# Patient Record
Sex: MALE | Race: WHITE | Employment: OTHER | URBAN - METROPOLITAN AREA
[De-identification: names, ages, dates, MRNs, and addresses within clinical notes are randomized per-mention and may not be internally consistent; named-entity substitution may affect disease eponyms.]

---

## 2017-11-28 PROBLEM — N21.0 BLADDER CALCULI: Status: ACTIVE | Noted: 2017-11-28

## 2017-11-28 PROBLEM — E87.20 LACTIC ACIDOSIS: Status: ACTIVE | Noted: 2017-11-28

## 2017-11-28 PROBLEM — N12 PYELITIS: Status: ACTIVE | Noted: 2017-11-28

## 2017-11-28 PROBLEM — R82.81 PYURIA: Status: ACTIVE | Noted: 2017-11-28

## 2017-11-28 PROBLEM — I82.429 ILIAC VEIN THROMBOSIS (HCC): Status: ACTIVE | Noted: 2017-11-28

## 2017-11-28 PROBLEM — D72.829 LEUKOCYTOSIS: Status: ACTIVE | Noted: 2017-11-28

## 2017-11-28 PROBLEM — N28.89 URETERITIS: Status: ACTIVE | Noted: 2017-11-28

## 2017-11-28 PROBLEM — Z86.73 HISTORY OF STROKE: Status: ACTIVE | Noted: 2017-11-28

## 2018-03-29 ENCOUNTER — HOSPITAL ENCOUNTER (OUTPATIENT)
Dept: CT IMAGING | Age: 58
Discharge: HOME | End: 2018-03-29
Attending: UROLOGY | Admitting: UROLOGY

## 2018-03-29 DIAGNOSIS — N21.0 CALCULUS IN BLADDER: ICD-10-CM

## 2018-03-29 DIAGNOSIS — N20.0 KIDNEY STONE: ICD-10-CM

## 2018-04-10 PROBLEM — Z86.73 HISTORY OF STROKE: Status: RESOLVED | Noted: 2017-11-28 | Resolved: 2018-04-10

## 2018-04-10 PROBLEM — E87.20 LACTIC ACIDOSIS: Status: RESOLVED | Noted: 2017-11-28 | Resolved: 2018-04-10

## 2018-04-10 PROBLEM — D72.829 LEUCOCYTOSIS: Status: RESOLVED | Noted: 2017-11-28 | Resolved: 2018-04-10

## 2018-04-10 PROBLEM — R47.01 EXPRESSIVE APHASIA: Status: ACTIVE | Noted: 2018-03-18

## 2018-04-10 RX ORDER — TAMSULOSIN HYDROCHLORIDE 0.4 MG/1
0.4 CAPSULE ORAL
COMMUNITY
Start: 2018-03-20

## 2018-04-10 RX ORDER — LEVETIRACETAM 500 MG/1
500 TABLET ORAL
COMMUNITY
Start: 2018-03-20 | End: 2019-06-18 | Stop reason: ALTCHOICE

## 2018-04-11 ENCOUNTER — TELEPHONE (OUTPATIENT)
Dept: FAMILY MEDICINE CLINIC | Age: 58
End: 2018-04-11

## 2018-04-11 ENCOUNTER — OFFICE VISIT (OUTPATIENT)
Dept: FAMILY MEDICINE CLINIC | Age: 58
End: 2018-04-11

## 2018-04-11 VITALS
DIASTOLIC BLOOD PRESSURE: 79 MMHG | HEART RATE: 79 BPM | OXYGEN SATURATION: 99 % | RESPIRATION RATE: 16 BRPM | HEIGHT: 67 IN | SYSTOLIC BLOOD PRESSURE: 115 MMHG

## 2018-04-11 DIAGNOSIS — R47.01 EXPRESSIVE APHASIA: ICD-10-CM

## 2018-04-11 DIAGNOSIS — D68.9 COAGULOPATHY (HCC): ICD-10-CM

## 2018-04-11 DIAGNOSIS — I63.9 CEREBRAL INFARCTION, UNSPECIFIED MECHANISM (HCC): ICD-10-CM

## 2018-04-11 DIAGNOSIS — Z01.818 PREOP EXAMINATION: Primary | ICD-10-CM

## 2018-04-11 LAB
ALBUMIN SERPL-MCNC: 3.8 G/DL (ref 3.4–5)
ALP BLD-CCNC: 60 U/L (ref 40–129)
ALT SERPL-CCNC: 8 U/L (ref 10–40)
ANION GAP SERPL CALCULATED.3IONS-SCNC: 12 MMOL/L (ref 3–16)
APTT: 26.2 SEC (ref 24.1–34.9)
AST SERPL-CCNC: 10 U/L (ref 15–37)
BILIRUB SERPL-MCNC: 0.4 MG/DL (ref 0–1)
BILIRUBIN DIRECT: <0.2 MG/DL (ref 0–0.3)
BILIRUBIN, INDIRECT: ABNORMAL MG/DL (ref 0–1)
BUN BLDV-MCNC: 14 MG/DL (ref 7–20)
CALCIUM SERPL-MCNC: 9.1 MG/DL (ref 8.3–10.6)
CHLORIDE BLD-SCNC: 101 MMOL/L (ref 99–110)
CO2: 29 MMOL/L (ref 21–32)
CREAT SERPL-MCNC: 0.9 MG/DL (ref 0.9–1.3)
GFR AFRICAN AMERICAN: >60
GFR NON-AFRICAN AMERICAN: >60
GLUCOSE BLD-MCNC: 98 MG/DL (ref 70–99)
HCT VFR BLD CALC: 38.5 % (ref 40.5–52.5)
HEMOGLOBIN: 12.9 G/DL (ref 13.5–17.5)
INR BLD: 1.07 (ref 0.85–1.15)
MCH RBC QN AUTO: 28.1 PG (ref 26–34)
MCHC RBC AUTO-ENTMCNC: 33.5 G/DL (ref 31–36)
MCV RBC AUTO: 83.9 FL (ref 80–100)
PDW BLD-RTO: 14.8 % (ref 12.4–15.4)
PLATELET # BLD: 249 K/UL (ref 135–450)
PMV BLD AUTO: 7.8 FL (ref 5–10.5)
POTASSIUM SERPL-SCNC: 3.9 MMOL/L (ref 3.5–5.1)
PROTHROMBIN TIME: 12.1 SEC (ref 9.6–13)
RBC # BLD: 4.59 M/UL (ref 4.2–5.9)
SODIUM BLD-SCNC: 142 MMOL/L (ref 136–145)
TOTAL PROTEIN: 5.9 G/DL (ref 6.4–8.2)
WBC # BLD: 8.8 K/UL (ref 4–11)

## 2018-04-11 PROCEDURE — 93000 ELECTROCARDIOGRAM COMPLETE: CPT | Performed by: INTERNAL MEDICINE

## 2018-04-11 PROCEDURE — G8427 DOCREV CUR MEDS BY ELIG CLIN: HCPCS | Performed by: INTERNAL MEDICINE

## 2018-04-11 PROCEDURE — G8598 ASA/ANTIPLAT THER USED: HCPCS | Performed by: INTERNAL MEDICINE

## 2018-04-11 PROCEDURE — 3017F COLORECTAL CA SCREEN DOC REV: CPT | Performed by: INTERNAL MEDICINE

## 2018-04-11 PROCEDURE — G8420 CALC BMI NORM PARAMETERS: HCPCS | Performed by: INTERNAL MEDICINE

## 2018-04-11 PROCEDURE — 1036F TOBACCO NON-USER: CPT | Performed by: INTERNAL MEDICINE

## 2018-04-11 PROCEDURE — 99205 OFFICE O/P NEW HI 60 MIN: CPT | Performed by: INTERNAL MEDICINE

## 2018-04-11 ASSESSMENT — PATIENT HEALTH QUESTIONNAIRE - PHQ9
1. LITTLE INTEREST OR PLEASURE IN DOING THINGS: 0
SUM OF ALL RESPONSES TO PHQ QUESTIONS 1-9: 0
2. FEELING DOWN, DEPRESSED OR HOPELESS: 0
SUM OF ALL RESPONSES TO PHQ9 QUESTIONS 1 & 2: 0

## 2018-04-16 ENCOUNTER — TELEPHONE (OUTPATIENT)
Dept: FAMILY MEDICINE CLINIC | Age: 58
End: 2018-04-16

## 2018-04-16 DIAGNOSIS — R47.01 EXPRESSIVE APHASIA: Primary | ICD-10-CM

## 2018-04-20 ENCOUNTER — TELEPHONE (OUTPATIENT)
Dept: FAMILY MEDICINE CLINIC | Age: 58
End: 2018-04-20

## 2018-06-14 ENCOUNTER — TELEPHONE (OUTPATIENT)
Dept: FAMILY MEDICINE CLINIC | Age: 58
End: 2018-06-14

## 2018-06-28 ENCOUNTER — TELEPHONE (OUTPATIENT)
Dept: FAMILY MEDICINE CLINIC | Age: 58
End: 2018-06-28

## 2019-05-02 ENCOUNTER — TELEPHONE (OUTPATIENT)
Dept: FAMILY MEDICINE CLINIC | Age: 59
End: 2019-05-02

## 2019-05-02 NOTE — TELEPHONE ENCOUNTER
Please have him schedule an apt since not seen over a year. Referral can be made then and prostate check.  Not today

## 2019-05-06 PROBLEM — R82.81 PYURIA: Status: RESOLVED | Noted: 2017-11-28 | Resolved: 2019-05-06

## 2019-06-18 ENCOUNTER — APPOINTMENT (OUTPATIENT)
Dept: CT IMAGING | Age: 59
DRG: 300 | End: 2019-06-18
Payer: MEDICARE

## 2019-06-18 ENCOUNTER — HOSPITAL ENCOUNTER (INPATIENT)
Age: 59
LOS: 7 days | Discharge: NURSING, REHAB OR LONG-TERM CARE | DRG: 300 | End: 2019-06-25
Attending: EMERGENCY MEDICINE | Admitting: INTERNAL MEDICINE
Payer: MEDICARE

## 2019-06-18 DIAGNOSIS — T76.91XA SUSPECTED ELDER ABUSE, INITIAL ENCOUNTER: ICD-10-CM

## 2019-06-18 DIAGNOSIS — N39.0 URINARY TRACT INFECTION WITHOUT HEMATURIA, SITE UNSPECIFIED: ICD-10-CM

## 2019-06-18 DIAGNOSIS — R53.1 GENERAL WEAKNESS: Primary | ICD-10-CM

## 2019-06-18 DIAGNOSIS — I82.90 ACUTE DEEP VEIN THROMBOSIS (DVT) OF NON-EXTREMITY VEIN: ICD-10-CM

## 2019-06-18 PROBLEM — I82.890 DEEP VENOUS THROMBOSIS OF PELVIC VEIN: Status: ACTIVE | Noted: 2019-06-18

## 2019-06-18 LAB
A/G RATIO: 1.5 (ref 1.1–2.2)
ALBUMIN SERPL-MCNC: 4.4 G/DL (ref 3.4–5)
ALP BLD-CCNC: 91 U/L (ref 40–129)
ALT SERPL-CCNC: 8 U/L (ref 10–40)
ANION GAP SERPL CALCULATED.3IONS-SCNC: 16 MMOL/L (ref 3–16)
APTT: 27.4 SEC (ref 26–36)
AST SERPL-CCNC: 11 U/L (ref 15–37)
BACTERIA: ABNORMAL /HPF
BASOPHILS ABSOLUTE: 0.1 K/UL (ref 0–0.2)
BASOPHILS RELATIVE PERCENT: 0.3 %
BILIRUB SERPL-MCNC: 1.2 MG/DL (ref 0–1)
BILIRUBIN URINE: NEGATIVE
BLOOD, URINE: ABNORMAL
BUN BLDV-MCNC: 38 MG/DL (ref 7–20)
CALCIUM SERPL-MCNC: 9.5 MG/DL (ref 8.3–10.6)
CHLORIDE BLD-SCNC: 101 MMOL/L (ref 99–110)
CLARITY: ABNORMAL
CO2: 24 MMOL/L (ref 21–32)
COLOR: YELLOW
CREAT SERPL-MCNC: 1.5 MG/DL (ref 0.9–1.3)
EOSINOPHILS ABSOLUTE: 0 K/UL (ref 0–0.6)
EOSINOPHILS RELATIVE PERCENT: 0.2 %
GFR AFRICAN AMERICAN: 58
GFR NON-AFRICAN AMERICAN: 48
GLOBULIN: 2.9 G/DL
GLUCOSE BLD-MCNC: 136 MG/DL (ref 70–99)
GLUCOSE BLD-MCNC: 142 MG/DL (ref 70–99)
GLUCOSE URINE: NEGATIVE MG/DL
HCT VFR BLD CALC: 36.3 % (ref 40.5–52.5)
HEMOGLOBIN: 12 G/DL (ref 13.5–17.5)
INR BLD: 1.18 (ref 0.86–1.14)
KETONES, URINE: NEGATIVE MG/DL
LACTIC ACID, SEPSIS: 2.6 MMOL/L (ref 0.4–1.9)
LEUKOCYTE ESTERASE, URINE: ABNORMAL
LYMPHOCYTES ABSOLUTE: 1.5 K/UL (ref 1–5.1)
LYMPHOCYTES RELATIVE PERCENT: 8.6 %
MCH RBC QN AUTO: 28.9 PG (ref 26–34)
MCHC RBC AUTO-ENTMCNC: 32.9 G/DL (ref 31–36)
MCV RBC AUTO: 87.7 FL (ref 80–100)
MICROSCOPIC EXAMINATION: YES
MONOCYTES ABSOLUTE: 1.3 K/UL (ref 0–1.3)
MONOCYTES RELATIVE PERCENT: 7.1 %
NEUTROPHILS ABSOLUTE: 15 K/UL (ref 1.7–7.7)
NEUTROPHILS RELATIVE PERCENT: 83.8 %
NITRITE, URINE: NEGATIVE
PDW BLD-RTO: 13.9 % (ref 12.4–15.4)
PERFORMED ON: ABNORMAL
PH UA: 6.5 (ref 5–8)
PLATELET # BLD: 159 K/UL (ref 135–450)
PMV BLD AUTO: 7.8 FL (ref 5–10.5)
POTASSIUM REFLEX MAGNESIUM: 4.3 MMOL/L (ref 3.5–5.1)
PROTEIN UA: 100 MG/DL
PROTHROMBIN TIME: 13.5 SEC (ref 9.8–13)
RBC # BLD: 4.14 M/UL (ref 4.2–5.9)
RBC UA: ABNORMAL /HPF (ref 0–2)
SODIUM BLD-SCNC: 141 MMOL/L (ref 136–145)
SPECIFIC GRAVITY UA: 1.02 (ref 1–1.03)
TOTAL PROTEIN: 7.3 G/DL (ref 6.4–8.2)
URINE REFLEX TO CULTURE: YES
URINE TYPE: ABNORMAL
UROBILINOGEN, URINE: 0.2 E.U./DL
WBC # BLD: 17.9 K/UL (ref 4–11)
WBC UA: >100 /HPF (ref 0–5)

## 2019-06-18 PROCEDURE — 70450 CT HEAD/BRAIN W/O DYE: CPT

## 2019-06-18 PROCEDURE — 87186 SC STD MICRODIL/AGAR DIL: CPT

## 2019-06-18 PROCEDURE — 85610 PROTHROMBIN TIME: CPT

## 2019-06-18 PROCEDURE — 6370000000 HC RX 637 (ALT 250 FOR IP): Performed by: EMERGENCY MEDICINE

## 2019-06-18 PROCEDURE — 2060000000 HC ICU INTERMEDIATE R&B

## 2019-06-18 PROCEDURE — 1200000000 HC SEMI PRIVATE

## 2019-06-18 PROCEDURE — 2580000003 HC RX 258: Performed by: PHYSICIAN ASSISTANT

## 2019-06-18 PROCEDURE — 2580000003 HC RX 258: Performed by: EMERGENCY MEDICINE

## 2019-06-18 PROCEDURE — 96360 HYDRATION IV INFUSION INIT: CPT

## 2019-06-18 PROCEDURE — 99285 EMERGENCY DEPT VISIT HI MDM: CPT

## 2019-06-18 PROCEDURE — 80053 COMPREHEN METABOLIC PANEL: CPT

## 2019-06-18 PROCEDURE — 83605 ASSAY OF LACTIC ACID: CPT

## 2019-06-18 PROCEDURE — 87040 BLOOD CULTURE FOR BACTERIA: CPT

## 2019-06-18 PROCEDURE — 6360000002 HC RX W HCPCS: Performed by: EMERGENCY MEDICINE

## 2019-06-18 PROCEDURE — 85025 COMPLETE CBC W/AUTO DIFF WBC: CPT

## 2019-06-18 PROCEDURE — 85730 THROMBOPLASTIN TIME PARTIAL: CPT

## 2019-06-18 PROCEDURE — 87077 CULTURE AEROBIC IDENTIFY: CPT

## 2019-06-18 PROCEDURE — 6360000004 HC RX CONTRAST MEDICATION: Performed by: NURSE PRACTITIONER

## 2019-06-18 PROCEDURE — 96361 HYDRATE IV INFUSION ADD-ON: CPT

## 2019-06-18 PROCEDURE — 75635 CT ANGIO ABDOMINAL ARTERIES: CPT

## 2019-06-18 PROCEDURE — 71275 CT ANGIOGRAPHY CHEST: CPT

## 2019-06-18 PROCEDURE — 87801 DETECT AGNT MULT DNA AMPLI: CPT

## 2019-06-18 PROCEDURE — 87086 URINE CULTURE/COLONY COUNT: CPT

## 2019-06-18 PROCEDURE — 93005 ELECTROCARDIOGRAM TRACING: CPT | Performed by: PHYSICIAN ASSISTANT

## 2019-06-18 PROCEDURE — 81001 URINALYSIS AUTO W/SCOPE: CPT

## 2019-06-18 RX ORDER — HEPARIN SODIUM 1000 [USP'U]/ML
80 INJECTION, SOLUTION INTRAVENOUS; SUBCUTANEOUS ONCE
Status: DISCONTINUED | OUTPATIENT
Start: 2019-06-18 | End: 2019-06-18

## 2019-06-18 RX ORDER — HEPARIN SODIUM 1000 [USP'U]/ML
40 INJECTION, SOLUTION INTRAVENOUS; SUBCUTANEOUS PRN
Status: DISCONTINUED | OUTPATIENT
Start: 2019-06-19 | End: 2019-06-18

## 2019-06-18 RX ORDER — HEPARIN SODIUM 1000 [USP'U]/ML
80 INJECTION, SOLUTION INTRAVENOUS; SUBCUTANEOUS PRN
Status: DISCONTINUED | OUTPATIENT
Start: 2019-06-19 | End: 2019-06-18

## 2019-06-18 RX ORDER — 0.9 % SODIUM CHLORIDE 0.9 %
1000 INTRAVENOUS SOLUTION INTRAVENOUS ONCE
Status: DISCONTINUED | OUTPATIENT
Start: 2019-06-18 | End: 2019-06-18

## 2019-06-18 RX ORDER — 0.9 % SODIUM CHLORIDE 0.9 %
1000 INTRAVENOUS SOLUTION INTRAVENOUS ONCE
Status: COMPLETED | OUTPATIENT
Start: 2019-06-18 | End: 2019-06-18

## 2019-06-18 RX ORDER — FINASTERIDE 5 MG/1
5 TABLET, FILM COATED ORAL DAILY
COMMUNITY

## 2019-06-18 RX ORDER — 0.9 % SODIUM CHLORIDE 0.9 %
30 INTRAVENOUS SOLUTION INTRAVENOUS ONCE
Status: COMPLETED | OUTPATIENT
Start: 2019-06-18 | End: 2019-06-19

## 2019-06-18 RX ADMIN — APIXABAN 10 MG: 5 TABLET, FILM COATED ORAL at 23:59

## 2019-06-18 RX ADMIN — SODIUM CHLORIDE 1000 ML: 9 INJECTION, SOLUTION INTRAVENOUS at 23:59

## 2019-06-18 RX ADMIN — MEROPENEM 1 G: 1 INJECTION, POWDER, FOR SOLUTION INTRAVENOUS at 23:59

## 2019-06-18 RX ADMIN — IOPAMIDOL 85 ML: 755 INJECTION, SOLUTION INTRAVENOUS at 21:08

## 2019-06-18 RX ADMIN — SODIUM CHLORIDE 1000 ML: 9 INJECTION, SOLUTION INTRAVENOUS at 18:47
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
## 2019-06-18 NOTE — ED NOTES
Upon arrival pts bilateral feet are purple in color. Pt has had prior stroke MD called to bedside.       Rashid Martinez, RN  06/18/19 9792

## 2019-06-18 NOTE — PROGRESS NOTES
Attempted to straight  Cath patient pt was saturated with urine prior to cath.  Left straight cath in place fluids started family updated on POC

## 2019-06-18 NOTE — CONSULTS
Pharmacy to dose Heparin:  High dose Heparin GTT protocol. Baseline aPTT is 27.4 seconds. Give a 5,300 unit IV Bolus and begin the infusion at 11.8mL/hr. Recheck the aPTT 6 hours later.   Queenie Villarreal PharmD 6/18/2019 7:05 PM

## 2019-06-19 ENCOUNTER — APPOINTMENT (OUTPATIENT)
Dept: GENERAL RADIOLOGY | Age: 59
DRG: 300 | End: 2019-06-19
Payer: MEDICARE

## 2019-06-19 PROBLEM — N39.0 UTI (URINARY TRACT INFECTION): Status: ACTIVE | Noted: 2019-06-19

## 2019-06-19 LAB
ANION GAP SERPL CALCULATED.3IONS-SCNC: 9 MMOL/L (ref 3–16)
BASOPHILS ABSOLUTE: 0.1 K/UL (ref 0–0.2)
BASOPHILS RELATIVE PERCENT: 0.6 %
BUN BLDV-MCNC: 30 MG/DL (ref 7–20)
CALCIUM SERPL-MCNC: 8.9 MG/DL (ref 8.3–10.6)
CHLORIDE BLD-SCNC: 105 MMOL/L (ref 99–110)
CO2: 23 MMOL/L (ref 21–32)
CREAT SERPL-MCNC: 0.9 MG/DL (ref 0.9–1.3)
EKG ATRIAL RATE: 92 BPM
EKG DIAGNOSIS: NORMAL
EKG P AXIS: 57 DEGREES
EKG P-R INTERVAL: 130 MS
EKG Q-T INTERVAL: 340 MS
EKG QRS DURATION: 88 MS
EKG QTC CALCULATION (BAZETT): 420 MS
EKG R AXIS: 46 DEGREES
EKG T AXIS: 48 DEGREES
EKG VENTRICULAR RATE: 92 BPM
EOSINOPHILS ABSOLUTE: 0.2 K/UL (ref 0–0.6)
EOSINOPHILS RELATIVE PERCENT: 1.5 %
GFR AFRICAN AMERICAN: >60
GFR NON-AFRICAN AMERICAN: >60
GLUCOSE BLD-MCNC: 124 MG/DL (ref 70–99)
HCT VFR BLD CALC: 29.1 % (ref 40.5–52.5)
HEMOGLOBIN: 9.8 G/DL (ref 13.5–17.5)
LACTIC ACID, SEPSIS: 1.2 MMOL/L (ref 0.4–1.9)
LYMPHOCYTES ABSOLUTE: 2.2 K/UL (ref 1–5.1)
LYMPHOCYTES RELATIVE PERCENT: 20 %
MAGNESIUM: 2.2 MG/DL (ref 1.8–2.4)
MCH RBC QN AUTO: 29.1 PG (ref 26–34)
MCHC RBC AUTO-ENTMCNC: 33.6 G/DL (ref 31–36)
MCV RBC AUTO: 86.5 FL (ref 80–100)
MONOCYTES ABSOLUTE: 1.1 K/UL (ref 0–1.3)
MONOCYTES RELATIVE PERCENT: 9.8 %
NEUTROPHILS ABSOLUTE: 7.3 K/UL (ref 1.7–7.7)
NEUTROPHILS RELATIVE PERCENT: 68.1 %
PDW BLD-RTO: 13.6 % (ref 12.4–15.4)
PLATELET # BLD: 127 K/UL (ref 135–450)
PMV BLD AUTO: 7.7 FL (ref 5–10.5)
POTASSIUM REFLEX MAGNESIUM: 3.4 MMOL/L (ref 3.5–5.1)
RBC # BLD: 3.36 M/UL (ref 4.2–5.9)
SODIUM BLD-SCNC: 137 MMOL/L (ref 136–145)
WBC # BLD: 10.8 K/UL (ref 4–11)

## 2019-06-19 PROCEDURE — 99233 SBSQ HOSP IP/OBS HIGH 50: CPT | Performed by: INTERNAL MEDICINE

## 2019-06-19 PROCEDURE — 6360000002 HC RX W HCPCS: Performed by: INTERNAL MEDICINE

## 2019-06-19 PROCEDURE — 73562 X-RAY EXAM OF KNEE 3: CPT

## 2019-06-19 PROCEDURE — 6370000000 HC RX 637 (ALT 250 FOR IP): Performed by: INTERNAL MEDICINE

## 2019-06-19 PROCEDURE — 80048 BASIC METABOLIC PNL TOTAL CA: CPT

## 2019-06-19 PROCEDURE — 83735 ASSAY OF MAGNESIUM: CPT

## 2019-06-19 PROCEDURE — 36415 COLL VENOUS BLD VENIPUNCTURE: CPT

## 2019-06-19 PROCEDURE — 93010 ELECTROCARDIOGRAM REPORT: CPT | Performed by: INTERNAL MEDICINE

## 2019-06-19 PROCEDURE — 83605 ASSAY OF LACTIC ACID: CPT

## 2019-06-19 PROCEDURE — 2060000000 HC ICU INTERMEDIATE R&B

## 2019-06-19 PROCEDURE — 6370000000 HC RX 637 (ALT 250 FOR IP): Performed by: PHYSICIAN ASSISTANT

## 2019-06-19 PROCEDURE — 99223 1ST HOSP IP/OBS HIGH 75: CPT | Performed by: PSYCHIATRY & NEUROLOGY

## 2019-06-19 PROCEDURE — 2580000003 HC RX 258: Performed by: INTERNAL MEDICINE

## 2019-06-19 PROCEDURE — 6370000000 HC RX 637 (ALT 250 FOR IP): Performed by: EMERGENCY MEDICINE

## 2019-06-19 PROCEDURE — 85025 COMPLETE CBC W/AUTO DIFF WBC: CPT

## 2019-06-19 PROCEDURE — 1200000000 HC SEMI PRIVATE

## 2019-06-19 RX ORDER — ACETAMINOPHEN 325 MG/1
650 TABLET ORAL EVERY 4 HOURS PRN
Status: DISCONTINUED | OUTPATIENT
Start: 2019-06-19 | End: 2019-06-25 | Stop reason: HOSPADM

## 2019-06-19 RX ORDER — OXYCODONE HYDROCHLORIDE 5 MG/1
5 TABLET ORAL EVERY 6 HOURS PRN
Status: DISCONTINUED | OUTPATIENT
Start: 2019-06-19 | End: 2019-06-20

## 2019-06-19 RX ORDER — ONDANSETRON 2 MG/ML
4 INJECTION INTRAMUSCULAR; INTRAVENOUS EVERY 6 HOURS PRN
Status: DISCONTINUED | OUTPATIENT
Start: 2019-06-19 | End: 2019-06-25 | Stop reason: HOSPADM

## 2019-06-19 RX ORDER — SODIUM CHLORIDE 0.9 % (FLUSH) 0.9 %
10 SYRINGE (ML) INJECTION EVERY 12 HOURS SCHEDULED
Status: DISCONTINUED | OUTPATIENT
Start: 2019-06-19 | End: 2019-06-25 | Stop reason: HOSPADM

## 2019-06-19 RX ORDER — SODIUM CHLORIDE 0.9 % (FLUSH) 0.9 %
10 SYRINGE (ML) INJECTION PRN
Status: DISCONTINUED | OUTPATIENT
Start: 2019-06-19 | End: 2019-06-25 | Stop reason: HOSPADM

## 2019-06-19 RX ADMIN — MEROPENEM 1 G: 1 INJECTION, POWDER, FOR SOLUTION INTRAVENOUS at 12:00

## 2019-06-19 RX ADMIN — APIXABAN 10 MG: 5 TABLET, FILM COATED ORAL at 20:12

## 2019-06-19 RX ADMIN — Medication 10 ML: at 20:12

## 2019-06-19 RX ADMIN — OXYCODONE HYDROCHLORIDE 5 MG: 5 TABLET ORAL at 12:25

## 2019-06-19 RX ADMIN — APIXABAN 10 MG: 5 TABLET, FILM COATED ORAL at 09:16

## 2019-06-19 RX ADMIN — OXYCODONE HYDROCHLORIDE 5 MG: 5 TABLET ORAL at 18:29

## 2019-06-19 RX ADMIN — Medication 10 ML: at 09:17

## 2019-06-19 RX ADMIN — ACETAMINOPHEN 650 MG: 325 TABLET ORAL at 09:17

## 2019-06-19 RX ADMIN — MEROPENEM 1 G: 1 INJECTION, POWDER, FOR SOLUTION INTRAVENOUS at 22:47

## 2019-06-19 ASSESSMENT — PAIN DESCRIPTION - FREQUENCY: FREQUENCY: INTERMITTENT

## 2019-06-19 ASSESSMENT — PAIN SCALES - WONG BAKER
WONGBAKER_NUMERICALRESPONSE: 4
WONGBAKER_NUMERICALRESPONSE: 0

## 2019-06-19 ASSESSMENT — PAIN SCALES - GENERAL
PAINLEVEL_OUTOF10: 2
PAINLEVEL_OUTOF10: 2
PAINLEVEL_OUTOF10: 4
PAINLEVEL_OUTOF10: 4
PAINLEVEL_OUTOF10: 0
PAINLEVEL_OUTOF10: 5
PAINLEVEL_OUTOF10: 0
PAINLEVEL_OUTOF10: 1

## 2019-06-19 ASSESSMENT — PAIN DESCRIPTION - ORIENTATION
ORIENTATION: RIGHT;LEFT
ORIENTATION: LEFT

## 2019-06-19 ASSESSMENT — PAIN DESCRIPTION - PAIN TYPE
TYPE: ACUTE PAIN
TYPE: ACUTE PAIN

## 2019-06-19 ASSESSMENT — PAIN DESCRIPTION - LOCATION
LOCATION: KNEE
LOCATION: LEG

## 2019-06-19 ASSESSMENT — PAIN DESCRIPTION - PROGRESSION: CLINICAL_PROGRESSION: GRADUALLY WORSENING

## 2019-06-19 ASSESSMENT — PAIN DESCRIPTION - DESCRIPTORS: DESCRIPTORS: ACHING

## 2019-06-19 ASSESSMENT — PAIN DESCRIPTION - ONSET
ONSET: GRADUAL
ONSET: GRADUAL
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
## 2019-06-19 NOTE — PLAN OF CARE
Problem: Infection:  Goal: Will remain free from infection  Description  Will remain free from infection  Outcome: Ongoing     Problem: Safety:  Goal: Free from accidental physical injury  Description  Free from accidental physical injury  Outcome: Ongoing  Goal: Free from intentional harm  Description  Free from intentional harm  Outcome: Ongoing     Problem: Daily Care:  Goal: Daily care needs are met  Description  Daily care needs are met  Outcome: Ongoing     Problem: Pain:  Goal: Patient's pain/discomfort is manageable  Description  Patient's pain/discomfort is manageable  Outcome: Ongoing     Problem: Skin Integrity:  Goal: Skin integrity will stabilize  Description  Skin integrity will stabilize  Outcome: Ongoing     Problem: Falls - Risk of:  Goal: Will remain free from falls  Description  Will remain free from falls  Outcome: Ongoing  Goal: Absence of physical injury  Description  Absence of physical injury  Outcome: Ongoing
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
## 2019-06-19 NOTE — PROGRESS NOTES
Occupational Therapy/Physical Therapy  Orders received, chart reviewed. RN requests holding therapy this am as patient has a lot going on right now. May be appropriate in pm.  Will reattempt. Gloria Guzman OTR/MECCA [address], PT, DPT #[id number]    Spoke with  who recommends holding evaluation until 6/20/19.   Gloria Guzman OTR/L [address], PT, DPT #[id number]

## 2019-06-19 NOTE — PROGRESS NOTES
Pt sitting in bed. Asked pt if he is in pain and points to his left knee. Pt can have pain medicine in one hour and let him know that. Call light within reach. Tele-sitter in room.

## 2019-06-19 NOTE — ED NOTES
Per MD Agustina Valerio, heparin gtt to be started d/t results of Ct scan of chest.      Rosalee Kaiser RN  06/18/19 5559
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
## 2019-06-19 NOTE — PROGRESS NOTES
Pt son showed up at the  of hospital.  States he needs to see the pt advocate to help him since his power of  paper work has run out. I checked with registration and the advocate is gone for the day. I asked him to come back tomorrow to see the advocate. As pt son walked to entrance he said \" you all are playing my dad who has aphasia but you state he does not want to see me\"  Son asked to leave at this time.  Nilam Gould Rn
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
## 2019-06-19 NOTE — PROGRESS NOTES
Called the [facility] Nurse per order and facility procedures. Seamus Wynne called back to get patient info.   Jessica Lemus RN

## 2019-06-19 NOTE — PROGRESS NOTES
Pt sitting in bed and just finished 100% of lunch. Asked pt if his pain is better and he nodded yes. Radiology in doing x ray now. Call light within reach. Tele-sitter on.
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
## 2019-06-19 NOTE — PROGRESS NOTES
Pt oriented to room and call light. Demonstrates call light use. Pt unable to express needs or wants. Trying to tell me something about his legs. Coworkers brought to bedside for help and only gathered him telling us about his IVC filter and blood clot. He does say \"yes\" when ask if he is having some pain BLE then also grabs penis. Attempt to answer admission questions with patient unsuccessful. He is becoming more agitated trying to communicate and unable to answer yes or no questions well at this time. Water provided. Urinal offered. Pt denies needs at this time. Calm quiet environment provided.
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
## 2019-06-20 LAB
ANION GAP SERPL CALCULATED.3IONS-SCNC: 10 MMOL/L (ref 3–16)
BASOPHILS ABSOLUTE: 0.1 K/UL (ref 0–0.2)
BASOPHILS RELATIVE PERCENT: 0.6 %
BUN BLDV-MCNC: 18 MG/DL (ref 7–20)
CALCIUM SERPL-MCNC: 9.1 MG/DL (ref 8.3–10.6)
CHLORIDE BLD-SCNC: 102 MMOL/L (ref 99–110)
CO2: 25 MMOL/L (ref 21–32)
CREAT SERPL-MCNC: 0.7 MG/DL (ref 0.9–1.3)
EOSINOPHILS ABSOLUTE: 0.3 K/UL (ref 0–0.6)
EOSINOPHILS RELATIVE PERCENT: 2.9 %
GFR AFRICAN AMERICAN: >60
GFR NON-AFRICAN AMERICAN: >60
GLUCOSE BLD-MCNC: 120 MG/DL (ref 70–99)
HCT VFR BLD CALC: 31.6 % (ref 40.5–52.5)
HEMOGLOBIN: 10.3 G/DL (ref 13.5–17.5)
LYMPHOCYTES ABSOLUTE: 1.8 K/UL (ref 1–5.1)
LYMPHOCYTES RELATIVE PERCENT: 15.4 %
MCH RBC QN AUTO: 28.4 PG (ref 26–34)
MCHC RBC AUTO-ENTMCNC: 32.5 G/DL (ref 31–36)
MCV RBC AUTO: 87.5 FL (ref 80–100)
MONOCYTES ABSOLUTE: 1.2 K/UL (ref 0–1.3)
MONOCYTES RELATIVE PERCENT: 9.7 %
NEUTROPHILS ABSOLUTE: 8.6 K/UL (ref 1.7–7.7)
NEUTROPHILS RELATIVE PERCENT: 71.4 %
PDW BLD-RTO: 14 % (ref 12.4–15.4)
PLATELET # BLD: 153 K/UL (ref 135–450)
PMV BLD AUTO: 7.6 FL (ref 5–10.5)
POTASSIUM SERPL-SCNC: 4 MMOL/L (ref 3.5–5.1)
RBC # BLD: 3.61 M/UL (ref 4.2–5.9)
REPORT: NORMAL
SODIUM BLD-SCNC: 137 MMOL/L (ref 136–145)
WBC # BLD: 12 K/UL (ref 4–11)

## 2019-06-20 PROCEDURE — 1200000000 HC SEMI PRIVATE

## 2019-06-20 PROCEDURE — 80048 BASIC METABOLIC PNL TOTAL CA: CPT

## 2019-06-20 PROCEDURE — 97162 PT EVAL MOD COMPLEX 30 MIN: CPT

## 2019-06-20 PROCEDURE — 2580000003 HC RX 258: Performed by: PHYSICIAN ASSISTANT

## 2019-06-20 PROCEDURE — 6360000002 HC RX W HCPCS: Performed by: PHYSICIAN ASSISTANT

## 2019-06-20 PROCEDURE — 36415 COLL VENOUS BLD VENIPUNCTURE: CPT

## 2019-06-20 PROCEDURE — 94761 N-INVAS EAR/PLS OXIMETRY MLT: CPT

## 2019-06-20 PROCEDURE — 97530 THERAPEUTIC ACTIVITIES: CPT

## 2019-06-20 PROCEDURE — 6360000002 HC RX W HCPCS: Performed by: INTERNAL MEDICINE

## 2019-06-20 PROCEDURE — 97166 OT EVAL MOD COMPLEX 45 MIN: CPT

## 2019-06-20 PROCEDURE — 6370000000 HC RX 637 (ALT 250 FOR IP): Performed by: EMERGENCY MEDICINE

## 2019-06-20 PROCEDURE — 2060000000 HC ICU INTERMEDIATE R&B

## 2019-06-20 PROCEDURE — 99233 SBSQ HOSP IP/OBS HIGH 50: CPT | Performed by: INTERNAL MEDICINE

## 2019-06-20 PROCEDURE — 6370000000 HC RX 637 (ALT 250 FOR IP): Performed by: PHYSICIAN ASSISTANT

## 2019-06-20 PROCEDURE — 85025 COMPLETE CBC W/AUTO DIFF WBC: CPT

## 2019-06-20 PROCEDURE — 2580000003 HC RX 258: Performed by: INTERNAL MEDICINE

## 2019-06-20 RX ORDER — OXYCODONE HYDROCHLORIDE 5 MG/1
5 TABLET ORAL EVERY 4 HOURS PRN
Status: DISCONTINUED | OUTPATIENT
Start: 2019-06-20 | End: 2019-06-25 | Stop reason: HOSPADM

## 2019-06-20 RX ADMIN — OXYCODONE HYDROCHLORIDE 5 MG: 5 TABLET ORAL at 21:36

## 2019-06-20 RX ADMIN — OXYCODONE HYDROCHLORIDE 5 MG: 5 TABLET ORAL at 14:33

## 2019-06-20 RX ADMIN — OXYCODONE HYDROCHLORIDE 5 MG: 5 TABLET ORAL at 10:17

## 2019-06-20 RX ADMIN — OXYCODONE HYDROCHLORIDE 5 MG: 5 TABLET ORAL at 06:04

## 2019-06-20 RX ADMIN — Medication 10 ML: at 21:36

## 2019-06-20 RX ADMIN — Medication 10 ML: at 09:15

## 2019-06-20 RX ADMIN — APIXABAN 10 MG: 5 TABLET, FILM COATED ORAL at 09:15

## 2019-06-20 RX ADMIN — APIXABAN 10 MG: 5 TABLET, FILM COATED ORAL at 21:36

## 2019-06-20 RX ADMIN — VANCOMYCIN HYDROCHLORIDE 1000 MG: 1 INJECTION, POWDER, LYOPHILIZED, FOR SOLUTION INTRAVENOUS at 13:38

## 2019-06-20 RX ADMIN — MEROPENEM 1 G: 1 INJECTION, POWDER, FOR SOLUTION INTRAVENOUS at 11:07

## 2019-06-20 ASSESSMENT — PAIN DESCRIPTION - LOCATION
LOCATION: KNEE

## 2019-06-20 ASSESSMENT — PAIN SCALES - GENERAL
PAINLEVEL_OUTOF10: 5
PAINLEVEL_OUTOF10: 0
PAINLEVEL_OUTOF10: 4
PAINLEVEL_OUTOF10: 0
PAINLEVEL_OUTOF10: 7
PAINLEVEL_OUTOF10: 0
PAINLEVEL_OUTOF10: 2

## 2019-06-20 ASSESSMENT — PAIN SCALES - WONG BAKER
WONGBAKER_NUMERICALRESPONSE: 6
WONGBAKER_NUMERICALRESPONSE: 4
WONGBAKER_NUMERICALRESPONSE: 2
WONGBAKER_NUMERICALRESPONSE: 4

## 2019-06-20 ASSESSMENT — PAIN DESCRIPTION - PROGRESSION
CLINICAL_PROGRESSION: GRADUALLY WORSENING
CLINICAL_PROGRESSION: GRADUALLY IMPROVING

## 2019-06-20 ASSESSMENT — PAIN DESCRIPTION - ORIENTATION
ORIENTATION: LEFT
ORIENTATION: LEFT

## 2019-06-20 ASSESSMENT — PAIN DESCRIPTION - PAIN TYPE
TYPE: ACUTE PAIN

## 2019-06-20 ASSESSMENT — PAIN DESCRIPTION - ONSET: ONSET: ON-GOING

## 2019-06-20 ASSESSMENT — PAIN DESCRIPTION - FREQUENCY: FREQUENCY: CONTINUOUS

## 2019-06-20 NOTE — PROGRESS NOTES
Pt pointing to knee when PCA in room. Asked if pt was hurting and nodded head yes. PRN pain pill given to pt, rated pain at 4-5 using faces scale.

## 2019-06-20 NOTE — PLAN OF CARE
Problem: Infection:  Goal: Will remain free from infection  Description  Will remain free from infection  Outcome: Ongoing     Problem: Safety:  Goal: Free from accidental physical injury  Description  Free from accidental physical injury  Outcome: Ongoing  Goal: Free from intentional harm  Description  Free from intentional harm  Outcome: Ongoing     Problem: Daily Care:  Goal: Daily care needs are met  Description  Daily care needs are met  Outcome: Ongoing     Problem: Pain:  Goal: Patient's pain/discomfort is manageable  Description  Patient's pain/discomfort is manageable  Outcome: Ongoing  Goal: Pain level will decrease  Description  Pain level will decrease  Outcome: Ongoing  Goal: Control of chronic pain  Description  Control of chronic pain  Outcome: Ongoing     Problem: Falls - Risk of:  Goal: Will remain free from falls  Description  Will remain free from falls  Outcome: Ongoing  Goal: Absence of physical injury  Description  Absence of physical injury  Outcome: Ongoing

## 2019-06-20 NOTE — PROGRESS NOTES
IV Merrem  hooked to pts IV  and pt started nodding head no, and pinching line pointing to IV, educated pt what med was being administered for. Pt still nodding head no. Made clinical Rusty Joy RN  aware. Rusty Joy RN  went to bedside with writer and educated pt on IV Merrem, pt becomes anxious and conts to point to IV Merrem and nods head no. Will cont to monitor and attempt med administration later.

## 2019-06-20 NOTE — PROGRESS NOTES
Attempted to administer IV Merrem pt still declines conts to nodding head no and pointing at IV. Perfect served Dr Sergio Navarro  to make aware, pt  Here for  UTI declines IV Merrem. Will cont to monitor.

## 2019-06-20 NOTE — CARE COORDINATION
INTERDISCIPLINARY PLAN OF CARE CONFERENCE    Date/Time: 6/20/2019 2:06 PM  Completed by: Cass Paulson, Case Management      Patient Name:  Chapin Cruz  YOB: 1960  Admitting Diagnosis: Deep venous thrombosis of pelvic vein [I82.90]     Admit Date/Time:  6/18/2019  6:13 PM    Chart reviewed. Interdisciplinary team met to discuss patient progress and discharge plans. Disciplines included Case Management, Nursing, and Dietitian. Current Status:ongoing    PT/OT recommendation:SNF    Anticipated Discharge Date: tbd  Expected D/C Disposition:  Home vs SNF  Confirmed plan with patient and/or family No  Discharge Plan Comments: Reviewed chart. Role of discharge planner explained and patient verbalized understanding by shaking his head yes. Pt wrote CM's name (Margot) on paper, as he was aware of CM. CM explained that  PT/OT needed to work with him today. CM asked if he planned to go home. Pt pointed to son's \"Dante\" name on paper and shook his head \"no\" quickly. CM explained that CM understood that pt did not want to go back home with Felipe Lan. Pt stated \"shew\". CM asked if this was correct. Pt shook his head \"yes\". CM explained that PT/OT was going to work with pt. Pt did not want CM to leave the room while PT./TO was there. CM explained that the plan if to have pt go to a Nursing facility for rehab. CM gave pt a list and showed pt [facility]--pt shook is head \"no. CM pointed and stated [facility] and pt shook his no. CM stated and pointed to [facility] and pt stated yes. Have not placed a referral to [facility] as of yet. Will wait, as pt tested positive for Bacteremia, as of now. Dr. Haydee Webb is calling pt's son, Felipe Lan. Dr. Gerda Martinez is calling pt's PCP Dr. Burgess Fitzgerald ([phone]) to discuss where pt was prior to admission. Per Dr. Gerda Martinez, would like a Speech eval to assess consistent way for pt to communicate due to expressive aphasia. Looking for a reliable way to communicate.  Order
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
## 2019-06-20 NOTE — PROGRESS NOTES
Pt sitting in bed awake. Educated pt on new iv vanc for infection in blood. Pt did not want to have medication hung. Educated the pt on the consequences if he does not get the antibiotic. After much education, pt agreed to receive medication.

## 2019-06-20 NOTE — PROGRESS NOTES
Pt sitting in bed this morning. Asked pt if he would like visitors or his son to visit today and nodded his head no. Asked him if he was in pain and nodded head no. Pt took meds this morning. Bruising around pt's eyes, knees, ankles/feet, and around penis. Call light within reach. Alarm in place.
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
## 2019-06-20 NOTE — PROGRESS NOTES
Pharmacy note:  Vancomycin Day #  1  Patient is a 60 yo male with MRSA bacteremia. Pharmacy was consulted to dose Vancomycin    WBC           BUN/SCr            Calc. CrCl                Ht.             Wt.   12                18/0.7               106 ml/min               5'7\"            65.8 kg    Based on patient's age, weight, and renal function will start Vancomycin 1000 mg IVPB q12h and check trough at steady state. Will adjust as necessary. Chriss Dhaliwal Pharm. D. 6/20/2019 2:14 PM

## 2019-06-21 LAB
ANION GAP SERPL CALCULATED.3IONS-SCNC: 8 MMOL/L (ref 3–16)
BASOPHILS ABSOLUTE: 0.1 K/UL (ref 0–0.2)
BASOPHILS RELATIVE PERCENT: 0.8 %
BUN BLDV-MCNC: 13 MG/DL (ref 7–20)
CALCIUM SERPL-MCNC: 8.5 MG/DL (ref 8.3–10.6)
CHLORIDE BLD-SCNC: 99 MMOL/L (ref 99–110)
CO2: 26 MMOL/L (ref 21–32)
CREAT SERPL-MCNC: 0.8 MG/DL (ref 0.9–1.3)
EOSINOPHILS ABSOLUTE: 0.5 K/UL (ref 0–0.6)
EOSINOPHILS RELATIVE PERCENT: 4.2 %
GFR AFRICAN AMERICAN: >60
GFR NON-AFRICAN AMERICAN: >60
GLUCOSE BLD-MCNC: 100 MG/DL (ref 70–99)
HCT VFR BLD CALC: 30.7 % (ref 40.5–52.5)
HEMOGLOBIN: 10.3 G/DL (ref 13.5–17.5)
LV EF: 53 %
LVEF MODALITY: NORMAL
LYMPHOCYTES ABSOLUTE: 2.7 K/UL (ref 1–5.1)
LYMPHOCYTES RELATIVE PERCENT: 23.3 %
MCH RBC QN AUTO: 29.3 PG (ref 26–34)
MCHC RBC AUTO-ENTMCNC: 33.7 G/DL (ref 31–36)
MCV RBC AUTO: 87.1 FL (ref 80–100)
MONOCYTES ABSOLUTE: 1.1 K/UL (ref 0–1.3)
MONOCYTES RELATIVE PERCENT: 9.5 %
NEUTROPHILS ABSOLUTE: 7.1 K/UL (ref 1.7–7.7)
NEUTROPHILS RELATIVE PERCENT: 62.2 %
ORGANISM: ABNORMAL
ORGANISM: ABNORMAL
PDW BLD-RTO: 13.4 % (ref 12.4–15.4)
PLATELET # BLD: 179 K/UL (ref 135–450)
PMV BLD AUTO: 7.7 FL (ref 5–10.5)
POTASSIUM REFLEX MAGNESIUM: 3.8 MMOL/L (ref 3.5–5.1)
RBC # BLD: 3.52 M/UL (ref 4.2–5.9)
SODIUM BLD-SCNC: 133 MMOL/L (ref 136–145)
URINE CULTURE, ROUTINE: ABNORMAL
WBC # BLD: 11.5 K/UL (ref 4–11)

## 2019-06-21 PROCEDURE — 2580000003 HC RX 258: Performed by: PHYSICIAN ASSISTANT

## 2019-06-21 PROCEDURE — 2580000003 HC RX 258: Performed by: INTERNAL MEDICINE

## 2019-06-21 PROCEDURE — 2060000000 HC ICU INTERMEDIATE R&B

## 2019-06-21 PROCEDURE — 85025 COMPLETE CBC W/AUTO DIFF WBC: CPT

## 2019-06-21 PROCEDURE — 1200000000 HC SEMI PRIVATE

## 2019-06-21 PROCEDURE — 92523 SPEECH SOUND LANG COMPREHEN: CPT

## 2019-06-21 PROCEDURE — 6370000000 HC RX 637 (ALT 250 FOR IP): Performed by: PHYSICIAN ASSISTANT

## 2019-06-21 PROCEDURE — 36415 COLL VENOUS BLD VENIPUNCTURE: CPT

## 2019-06-21 PROCEDURE — 6360000002 HC RX W HCPCS: Performed by: PHYSICIAN ASSISTANT

## 2019-06-21 PROCEDURE — 94761 N-INVAS EAR/PLS OXIMETRY MLT: CPT

## 2019-06-21 PROCEDURE — 6370000000 HC RX 637 (ALT 250 FOR IP): Performed by: INTERNAL MEDICINE

## 2019-06-21 PROCEDURE — 6370000000 HC RX 637 (ALT 250 FOR IP): Performed by: EMERGENCY MEDICINE

## 2019-06-21 PROCEDURE — 80048 BASIC METABOLIC PNL TOTAL CA: CPT

## 2019-06-21 PROCEDURE — 99232 SBSQ HOSP IP/OBS MODERATE 35: CPT | Performed by: INTERNAL MEDICINE

## 2019-06-21 PROCEDURE — 93306 TTE W/DOPPLER COMPLETE: CPT

## 2019-06-21 RX ORDER — NITROFURANTOIN 25; 75 MG/1; MG/1
100 CAPSULE ORAL EVERY 6 HOURS SCHEDULED
Status: DISCONTINUED | OUTPATIENT
Start: 2019-06-21 | End: 2019-06-25 | Stop reason: HOSPADM

## 2019-06-21 RX ADMIN — OXYCODONE HYDROCHLORIDE 5 MG: 5 TABLET ORAL at 01:42

## 2019-06-21 RX ADMIN — NITROFURANTOIN (MONOHYDRATE/MACROCRYSTALS) 100 MG: 75; 25 CAPSULE ORAL at 14:19

## 2019-06-21 RX ADMIN — OXYCODONE HYDROCHLORIDE 5 MG: 5 TABLET ORAL at 23:05

## 2019-06-21 RX ADMIN — VANCOMYCIN HYDROCHLORIDE 1000 MG: 1 INJECTION, POWDER, LYOPHILIZED, FOR SOLUTION INTRAVENOUS at 00:20

## 2019-06-21 RX ADMIN — APIXABAN 10 MG: 5 TABLET, FILM COATED ORAL at 09:30

## 2019-06-21 RX ADMIN — OXYCODONE HYDROCHLORIDE 5 MG: 5 TABLET ORAL at 06:15

## 2019-06-21 RX ADMIN — Medication 10 ML: at 22:16

## 2019-06-21 RX ADMIN — OXYCODONE HYDROCHLORIDE 5 MG: 5 TABLET ORAL at 14:19

## 2019-06-21 RX ADMIN — APIXABAN 10 MG: 5 TABLET, FILM COATED ORAL at 22:15

## 2019-06-21 RX ADMIN — Medication 10 ML: at 09:31

## 2019-06-21 ASSESSMENT — PAIN SCALES - GENERAL
PAINLEVEL_OUTOF10: 5
PAINLEVEL_OUTOF10: 7
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
## 2019-06-21 NOTE — PROGRESS NOTES
Shift assessment complete. See flow sheet. Due medications administered per MD orders. See MAR. Vital signs stable. Patient is alert and nonverbal. Pt denies any present needs/concerns. Call light explained and in reach. Will continue to monitor.

## 2019-06-21 NOTE — PROGRESS NOTES
Patient took scheduled medication and prn pain medication at this time. Patient adamit about keeping wrappers to medications at this time. Patient took each pill whole with water.  Sarah Neal

## 2019-06-21 NOTE — PROGRESS NOTES
Patient alert in bed, patient agreeable to morning assessment after attempting x 2. Patient took pills whole one at at time, with water patient tolerated well. Patient's RUE is flaccid, foot drop noted on RLE. Patient unable to verbalize needs but consistently rubbing upper thighs, time of next pain medication reviewed with patient. Blood drainage noted around IV site, blood return noted when flushed. AVS in place for patient safety.  Sarah Neal
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
## 2019-06-21 NOTE — PROGRESS NOTES
Patient awoken from sleep easily. Patient shown scheduled PO ABT capsule at this time, patient shook head no in regard to taking medication. This nurse worked to educate patient on the importance of taking ABT to treat UTI, patient continued to shake head and refuse medication. Will attempt again.   Sarah Neal

## 2019-06-21 NOTE — PROGRESS NOTES
Speech Language Pathology  Evaluation Attempt Note    Attempted x2 to see pt for speech/swallowing evaluation. Reviewed chart, spoke with RN prior to attempts. During both attempts, pt shook head 'no' when asked to participate. Will re-attempt later as able. Of note, in September 2017 during hospitalization at [facility] following CVA, speech therapy note indicated pt with global aphasia and yes/no accuracy of 25% at that time; as it has been ~2 years since stroke, may expect some improvement in current yes/no accuracy. Also, per results of MBSS completed during [facility] stay in 2017, pt tolerated regular/thin liquids, but silently aspirated on thin liquid with straws.     Rut Hudson M.A., [address]  Speech-Language Pathologist
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
## 2019-06-22 LAB
BLOOD CULTURE, ROUTINE: ABNORMAL
ORGANISM: ABNORMAL
ORGANISM: ABNORMAL

## 2019-06-22 PROCEDURE — 99232 SBSQ HOSP IP/OBS MODERATE 35: CPT | Performed by: INTERNAL MEDICINE

## 2019-06-22 PROCEDURE — 1200000000 HC SEMI PRIVATE

## 2019-06-22 PROCEDURE — 6370000000 HC RX 637 (ALT 250 FOR IP): Performed by: PHYSICIAN ASSISTANT

## 2019-06-22 PROCEDURE — 2580000003 HC RX 258: Performed by: INTERNAL MEDICINE

## 2019-06-22 PROCEDURE — 6370000000 HC RX 637 (ALT 250 FOR IP): Performed by: EMERGENCY MEDICINE

## 2019-06-22 PROCEDURE — 94761 N-INVAS EAR/PLS OXIMETRY MLT: CPT

## 2019-06-22 PROCEDURE — 92507 TX SP LANG VOICE COMM INDIV: CPT

## 2019-06-22 PROCEDURE — 6370000000 HC RX 637 (ALT 250 FOR IP): Performed by: INTERNAL MEDICINE

## 2019-06-22 RX ADMIN — APIXABAN 10 MG: 5 TABLET, FILM COATED ORAL at 09:05

## 2019-06-22 RX ADMIN — OXYCODONE HYDROCHLORIDE 5 MG: 5 TABLET ORAL at 11:58

## 2019-06-22 RX ADMIN — APIXABAN 10 MG: 5 TABLET, FILM COATED ORAL at 22:14

## 2019-06-22 RX ADMIN — Medication 10 ML: at 09:16

## 2019-06-22 RX ADMIN — Medication 10 ML: at 22:14

## 2019-06-22 RX ADMIN — OXYCODONE HYDROCHLORIDE 5 MG: 5 TABLET ORAL at 18:40

## 2019-06-22 ASSESSMENT — PAIN SCALES - GENERAL
PAINLEVEL_OUTOF10: 0
PAINLEVEL_OUTOF10: 5
PAINLEVEL_OUTOF10: 4

## 2019-06-22 NOTE — PLAN OF CARE
Problem: Infection:  Goal: Will remain free from infection  Description  Will remain free from infection  Outcome: Ongoing     Problem: Safety:  Goal: Free from accidental physical injury  Description  Free from accidental physical injury  Outcome: Ongoing  Goal: Free from intentional harm  Description  Free from intentional harm  Outcome: Ongoing     Problem: Daily Care:  Goal: Daily care needs are met  Description  Daily care needs are met  Outcome: Ongoing     Problem: Pain:  Goal: Patient's pain/discomfort is manageable  Description  Patient's pain/discomfort is manageable  Outcome: Ongoing  Goal: Pain level will decrease  Description  Pain level will decrease  Outcome: Ongoing  Goal: Control of acute pain  Description  Control of acute pain  Outcome: Ongoing     Problem: Skin Integrity:  Goal: Skin integrity will stabilize  Description  Skin integrity will stabilize  Outcome: Ongoing     Problem: Discharge Planning:  Goal: Patients continuum of care needs are met  Description  Patients continuum of care needs are met  Outcome: Ongoing     Problem: Falls - Risk of:  Goal: Will remain free from falls  Description  Will remain free from falls  Outcome: Ongoing  Goal: Absence of physical injury  Description  Absence of physical injury  Outcome: Ongoing     Problem: Urinary Elimination:  Goal: Signs and symptoms of infection will decrease  Description  Signs and symptoms of infection will decrease  Outcome: Ongoing     Problem: Risk for Impaired Skin Integrity  Goal: Tissue integrity - skin and mucous membranes  Description  Structural intactness and normal physiological function of skin and  mucous membranes.   Outcome: Ongoing     Problem: Discharge Planning:  Goal: Participates in care planning  Description  Participates in care planning  Outcome: Ongoing     Problem: Injury - Risk of, Physical Injury:  Goal: Will remain free from falls  Description  Will remain free from falls  Outcome: Ongoing  Goal: Absence
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
## 2019-06-22 NOTE — PROGRESS NOTES
Awake; denies needs/concerns. Requested door to be closed. telesitter in use.   Will continue to monitor

## 2019-06-22 NOTE — PROGRESS NOTES
Occupational/Physical Therapy Attempt    Unit: [address] [place]   Date:  6/22/2019  Patient Name:    Checo King  Admitting diagnosis:  Deep venous thrombosis of pelvic vein [I82.90]  Admit Date:  6/18/2019    Attempt @ 1225: Decline. Pt waving hand at therapist for them to leave the room. Pt continues to perserverate on the following items: 1. Holding eliquis packaging, pointing to pill drawing on paper, then pointing to drawing of stars 2. Pointing to L knee. Pt looking at Nikolai Quiroz and showing her the medication. Will re-attempt as time allows and pt is agreeable/appropriate. Thank you.      Mariya Almeida, MOT, OTR/L   [id number]  Magen Richards, PT #[id number]     No Charge

## 2019-06-22 NOTE — PROGRESS NOTES
[facility]

## 2019-06-22 NOTE — PROGRESS NOTES
Bedside report and care transferred to [facility]. AT [facility]. Pt denies further needs.

## 2019-06-22 NOTE — PROGRESS NOTES
and waving SLP away. Treatment discontinued at this time. See above goals for current level of function. Recommend continue multi-modal communication methods to ensure patient's wants and needs are met. ST will continue to follow. Charges/Time  Time in: 911  Time out: 927 (16 minutes)   Charges: Speech/language treatment    Patient was left semi-reclined in bed, with call light in reach, all need met. Safety handoff completed with Rn/Fern, who verbalized understanding    Plan:  Continued daily Speech/Language treatment with goals per ST plan of care. If patient discharges prior to next ST treatment session, this note will serve as the discharge.  Speech therapy is recommended at discharge    [address] [facility]  Speech Language Pathologist

## 2019-06-23 PROCEDURE — 6370000000 HC RX 637 (ALT 250 FOR IP): Performed by: EMERGENCY MEDICINE

## 2019-06-23 PROCEDURE — 1200000000 HC SEMI PRIVATE

## 2019-06-23 PROCEDURE — 2580000003 HC RX 258: Performed by: INTERNAL MEDICINE

## 2019-06-23 PROCEDURE — 6370000000 HC RX 637 (ALT 250 FOR IP): Performed by: PHYSICIAN ASSISTANT

## 2019-06-23 PROCEDURE — 94761 N-INVAS EAR/PLS OXIMETRY MLT: CPT

## 2019-06-23 PROCEDURE — 99231 SBSQ HOSP IP/OBS SF/LOW 25: CPT | Performed by: INTERNAL MEDICINE

## 2019-06-23 RX ADMIN — APIXABAN 10 MG: 5 TABLET, FILM COATED ORAL at 08:34

## 2019-06-23 RX ADMIN — OXYCODONE HYDROCHLORIDE 5 MG: 5 TABLET ORAL at 08:34

## 2019-06-23 RX ADMIN — APIXABAN 10 MG: 5 TABLET, FILM COATED ORAL at 20:19

## 2019-06-23 RX ADMIN — OXYCODONE HYDROCHLORIDE 5 MG: 5 TABLET ORAL at 17:24

## 2019-06-23 RX ADMIN — Medication 10 ML: at 08:32

## 2019-06-23 RX ADMIN — OXYCODONE HYDROCHLORIDE 5 MG: 5 TABLET ORAL at 02:51

## 2019-06-23 RX ADMIN — OXYCODONE HYDROCHLORIDE 5 MG: 5 TABLET ORAL at 21:56

## 2019-06-23 RX ADMIN — Medication 10 ML: at 20:21

## 2019-06-23 RX ADMIN — OXYCODONE HYDROCHLORIDE 5 MG: 5 TABLET ORAL at 12:54

## 2019-06-23 ASSESSMENT — PAIN SCALES - WONG BAKER
WONGBAKER_NUMERICALRESPONSE: 6
WONGBAKER_NUMERICALRESPONSE: 8
WONGBAKER_NUMERICALRESPONSE: 6

## 2019-06-23 ASSESSMENT — PAIN SCALES - GENERAL
PAINLEVEL_OUTOF10: 7
PAINLEVEL_OUTOF10: 0
PAINLEVEL_OUTOF10: 6
PAINLEVEL_OUTOF10: 0
PAINLEVEL_OUTOF10: 0

## 2019-06-23 ASSESSMENT — PAIN DESCRIPTION - ORIENTATION
ORIENTATION: LEFT
ORIENTATION: LEFT

## 2019-06-23 ASSESSMENT — PAIN DESCRIPTION - LOCATION
LOCATION: KNEE;LEG
LOCATION: KNEE;LEG

## 2019-06-23 NOTE — PROGRESS NOTES
Bedside report and care transferred to [facility]. AT [facility]. Pt denies further needs.

## 2019-06-23 NOTE — PROGRESS NOTES
See PM shift assessment. Small hard BM noted; suggested stool softener or laxative and patient declines. Good bowel sounds. Reports pain to left leg.
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
## 2019-06-23 NOTE — FLOWSHEET NOTE
06/23/19 0823   Vital Signs   Temp 99.1 °F (37.3 °C)   Temp Source Axillary   Pulse 75   Resp 18   /74   BP Location Right upper arm   Patient Position Semi fowlers   Level of Consciousness 0   MEWS Score 2   Oxygen Therapy   SpO2 98 %   O2 Device None (Room air)   AM assessment and meds complete. Pt attends changed. Denies further needs. Call light in reach. Will monitor.

## 2019-06-23 NOTE — PLAN OF CARE
Problem: Infection:  Goal: Will remain free from infection  Description  Will remain free from infection  Outcome: Ongoing     Problem: Safety:  Goal: Free from accidental physical injury  Description  Free from accidental physical injury  Outcome: Ongoing  Goal: Free from intentional harm  Description  Free from intentional harm  Outcome: Ongoing     Problem: Daily Care:  Goal: Daily care needs are met  Description  Daily care needs are met  Outcome: Ongoing     Problem: Pain:  Goal: Patient's pain/discomfort is manageable  Description  Patient's pain/discomfort is manageable  Outcome: Ongoing  Goal: Pain level will decrease  Description  Pain level will decrease  Outcome: Ongoing  Goal: Control of acute pain  Description  Control of acute pain  Outcome: Ongoing     Problem: Skin Integrity:  Goal: Skin integrity will stabilize  Description  Skin integrity will stabilize  Outcome: Ongoing     Problem: Discharge Planning:  Goal: Patients continuum of care needs are met  Description  Patients continuum of care needs are met  Outcome: Ongoing     Problem: Falls - Risk of:  Goal: Will remain free from falls  Description  Will remain free from falls  Outcome: Ongoing  Goal: Absence of physical injury  Description  Absence of physical injury  Outcome: Ongoing     Problem: Sensory:  Goal: General experience of comfort will improve  Description  General experience of comfort will improve  Outcome: Ongoing     Problem: Risk for Impaired Skin Integrity  Goal: Tissue integrity - skin and mucous membranes  Description  Structural intactness and normal physiological function of skin and  mucous membranes.   Outcome: Ongoing     Problem: Injury - Risk of, Physical Injury:  Goal: Will remain free from falls  Description  Will remain free from falls  Outcome: Ongoing  Goal: Absence of physical injury  Description  Absence of physical injury  Outcome: Ongoing     Problem: Sleep Pattern Disturbance:  Goal: Appears

## 2019-06-24 LAB — CULTURE, BLOOD 2: NORMAL

## 2019-06-24 PROCEDURE — 6370000000 HC RX 637 (ALT 250 FOR IP): Performed by: EMERGENCY MEDICINE

## 2019-06-24 PROCEDURE — 92507 TX SP LANG VOICE COMM INDIV: CPT

## 2019-06-24 PROCEDURE — 2580000003 HC RX 258: Performed by: INTERNAL MEDICINE

## 2019-06-24 PROCEDURE — 6370000000 HC RX 637 (ALT 250 FOR IP): Performed by: INTERNAL MEDICINE

## 2019-06-24 PROCEDURE — 99231 SBSQ HOSP IP/OBS SF/LOW 25: CPT | Performed by: INTERNAL MEDICINE

## 2019-06-24 PROCEDURE — 1200000000 HC SEMI PRIVATE

## 2019-06-24 PROCEDURE — 6370000000 HC RX 637 (ALT 250 FOR IP): Performed by: PHYSICIAN ASSISTANT

## 2019-06-24 RX ADMIN — ACETAMINOPHEN 650 MG: 325 TABLET ORAL at 14:06

## 2019-06-24 RX ADMIN — APIXABAN 10 MG: 5 TABLET, FILM COATED ORAL at 09:36

## 2019-06-24 RX ADMIN — APIXABAN 10 MG: 5 TABLET, FILM COATED ORAL at 22:04

## 2019-06-24 RX ADMIN — Medication 10 ML: at 22:05

## 2019-06-24 RX ADMIN — Medication 10 ML: at 09:37

## 2019-06-24 RX ADMIN — ACETAMINOPHEN 650 MG: 325 TABLET ORAL at 02:14

## 2019-06-24 RX ADMIN — ACETAMINOPHEN 650 MG: 325 TABLET ORAL at 22:05

## 2019-06-24 ASSESSMENT — PAIN SCALES - GENERAL
PAINLEVEL_OUTOF10: 6
PAINLEVEL_OUTOF10: 0
PAINLEVEL_OUTOF10: 5
PAINLEVEL_OUTOF10: 5
PAINLEVEL_OUTOF10: 4
PAINLEVEL_OUTOF10: 5
PAINLEVEL_OUTOF10: 6

## 2019-06-24 NOTE — PROGRESS NOTES
Physical Therapy/Occupational Therapy    Attempted PT/OT treatment this AM. Extensive education of purpose of therapy treatment and plan for treatment session. Pt minimally maintaining eye contact with conversation. Attempted to assist pt to EOB to initiate mobility, pt resisting and declining to sit EOB. Pt shouting and grimacing, unable to identify or point to pain. Appeared to be from R side, possibly muscle spasm, unsure as pt unable to confirm. Discussed with pt's RN. Will follow-up later today as pt status and therapist's schedule allows. 1355: Re-attempted PT/OT treatment this afternoon. Pt shaking head no adamantly to any mobility. Educated pt on importance of mobility, continued to decline. Will follow-up tomorrow as appropriate. No charge.     Yousuf Segundo, PT, DPT #[id number]  [facility] OTR/L 10504

## 2019-06-24 NOTE — PLAN OF CARE
Safety being maintained. Still with difficulty communicating with pt due to old stroke. Pt refusing po antibiotic & MD aware. Pt incont of urine. Lower legs discolored but not other skin issues at this time.

## 2019-06-24 NOTE — PROGRESS NOTES
Pain medication given at patients request.  Will not indicate by pointing where pain is but providing facial grimaces and vocalizing discomfort. Suddenly he is still and face appears calm after having given pain medication. Patient eating ice chips. Declines need to have incontinence brief changed at this time. Will follow up appropriately. Right lower extrem is swollen; foot is pink with ecchymosis; warm to touch but patient will not allow enough palpation to check pedal pulse.   Will continue to monitor
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
## 2019-06-24 NOTE — PROGRESS NOTES
Awake & resting in bed. Does not consistently make eye contact with nurse when nurse talking to him. Shift assessment completed. Shift assessment completed.
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
## 2019-06-25 VITALS
HEART RATE: 76 BPM | BODY MASS INDEX: 22.76 KG/M2 | SYSTOLIC BLOOD PRESSURE: 108 MMHG | DIASTOLIC BLOOD PRESSURE: 65 MMHG | RESPIRATION RATE: 17 BRPM | TEMPERATURE: 98 F | WEIGHT: 145 LBS | HEIGHT: 67 IN | OXYGEN SATURATION: 100 %

## 2019-06-25 PROCEDURE — 6370000000 HC RX 637 (ALT 250 FOR IP): Performed by: INTERNAL MEDICINE

## 2019-06-25 PROCEDURE — 97530 THERAPEUTIC ACTIVITIES: CPT

## 2019-06-25 PROCEDURE — 99238 HOSP IP/OBS DSCHRG MGMT 30/<: CPT | Performed by: INTERNAL MEDICINE

## 2019-06-25 RX ORDER — LEVETIRACETAM 500 MG/1
500 TABLET ORAL 2 TIMES DAILY
COMMUNITY

## 2019-06-25 RX ORDER — OXYCODONE AND ACETAMINOPHEN 2.5; 325 MG/1; MG/1
1 TABLET ORAL 4 TIMES DAILY PRN
Qty: 6 TABLET | Refills: 0 | Status: SHIPPED | OUTPATIENT
Start: 2019-06-25 | End: 2019-06-28

## 2019-06-25 RX ORDER — NITROFURANTOIN 25; 75 MG/1; MG/1
100 CAPSULE ORAL 2 TIMES DAILY
Qty: 14 CAPSULE | Refills: 0 | Status: SHIPPED | OUTPATIENT
Start: 2019-06-25 | End: 2019-07-02

## 2019-06-25 RX ADMIN — ACETAMINOPHEN 650 MG: 325 TABLET ORAL at 04:32

## 2019-06-25 RX ADMIN — ACETAMINOPHEN 650 MG: 325 TABLET ORAL at 09:33

## 2019-06-25 ASSESSMENT — PAIN DESCRIPTION - PAIN TYPE: TYPE: ACUTE PAIN

## 2019-06-25 ASSESSMENT — PAIN SCALES - GENERAL
PAINLEVEL_OUTOF10: 6
PAINLEVEL_OUTOF10: 5

## 2019-06-25 ASSESSMENT — PAIN DESCRIPTION - DESCRIPTORS: DESCRIPTORS: PATIENT UNABLE TO DESCRIBE

## 2019-06-25 ASSESSMENT — PAIN SCALES - WONG BAKER
WONGBAKER_NUMERICALRESPONSE: 6
WONGBAKER_NUMERICALRESPONSE: 2

## 2019-06-25 ASSESSMENT — PAIN DESCRIPTION - LOCATION: LOCATION: KNEE;LEG

## 2019-06-25 ASSESSMENT — PAIN DESCRIPTION - ORIENTATION: ORIENTATION: LEFT

## 2019-06-25 NOTE — PROGRESS NOTES
Progress Note    Admit Date:  6/18/2019    No acute events    Subjective:  Mr. Edward Sanabria is is resting comfortably in bed. Tries to communicate but not verbalizing  Using left hand to communicate   Able to maneuver bed controls normally  Does not make any sense        6/25- no new issues. Awaiting pre cert. Objective:  /67   Pulse 78   Temp 98.3 °F (36.8 °C) (Oral)   Resp 18   Ht 5' 7\" (1.702 m)   Wt 145 lb (65.8 kg)   SpO2 99%   BMI 22.71 kg/m²          Intake/Output Summary (Last 24 hours) at 6/25/2019 0755  Last data filed at 6/24/2019 1747  Gross per 24 hour   Intake 600 ml   Output --   Net 600 ml       Physical Exam:    Gen: Appears older than stated age. No distress. Alert. Eyes:No conjunctival injection. ENT: No discharge. Pharynx clear. Neck: Trachea midline. Resp: No accessory muscle use. No crackles. No wheezes. No rhonchi. CV: Regular rate. Regular rhythm. No murmur. No rub. No edema. Capillary Refill: Brisk,< 3 seconds   Peripheral Pulses: +2 palpable, equal bilaterally   GI: Non-tender. Non-distended. Normal bowel sounds. Skin: resolved ecchymoses to left knee    M/S:  Decreased ROM in left knee    Neuro: Awake. Follows some commands well, R sided weakness,Expressive aphasia   Psych: no anxiety       Scheduled Meds:   nitrofurantoin (macrocrystal-monohydrate)  100 mg Oral 4 times per day    sodium chloride flush  10 mL Intravenous 2 times per day    apixaban  10 mg Oral BID       Continuous Infusions:      PRN Meds:  oxyCODONE, perflutren lipid microspheres, sodium chloride flush, magnesium hydroxide, ondansetron, acetaminophen      Data:  CBC:   No results for input(s): WBC, HGB, HCT, MCV, PLT in the last 72 hours. BMP:   No results for input(s): NA, K, CL, CO2, PHOS, BUN, CREATININE in the last 72 hours. Invalid input(s): CA  LIVER PROFILE:   No results for input(s): AST, ALT, LIPASE, BILIDIR, BILITOT, ALKPHOS in the last 72 hours. Invalid input(s):   AMYLASE, ALB  PT/INR:   No results for input(s): PROTIME, INR in the last 72 hours. CULTURES  Blood Cx: Coag negative staph   Urine Cx: Pseudomonas, enterococcus faecalis     Pseudomonas aeruginosa (1)     Antibiotic Interpretation MAURILIO Status    cefepime Sensitive 8 mcg/mL     ciprofloxacin Resistant >2 mcg/mL     gentamicin Sensitive <=4 mcg/mL     meropenem Sensitive <=1 mcg/mL     piperacillin-tazobactam Sensitive <=16 mcg/mL     tobramycin Sensitive <=4 mcg/mL     Enterococcus faecalis (2)     Antibiotic Interpretation MAURILIO Status    ampicillin Sensitive <=2 mcg/mL     ciprofloxacin Sensitive <=0.5 mcg/mL     levofloxacin Sensitive 0.5 mcg/mL     nitrofurantoin Sensitive <=16 mcg/mL     tetracycline Resistant >=16 mcg/mL     vancomycin Sensitive 1 mcg/mL          RADIOLOGY  XR KNEE LEFT (3 VIEWS)   Final Result   1. No acute osseous abnormality   2. Mild medial and patellofemoral compartment osteoarthritis with trace joint   effusion         CTA ABDOMINAL AORTA W BILAT RUNOFF W CONTRAST   Final Result   Addendum 1 of 1   ADDENDUM:   3D reconstructions were obtained at an independent workstation. Final      CTA PULMONARY W CONTRAST   Final Result   Addendum 1 of 1   ADDENDUM:   3D reconstructions were obtained at an independent workstation. Final      CT Head WO Contrast   Final Result   No acute intracranial abnormality. Assessment/Plan:    DVT around IVC filter    -History of PE, was supposed to be on blood thinners but has not been- had hx of IVC filter  - now started on ELIQUIS, tolerating well.  Continue     History of CVA with right-sided weakness and expressive aphasia  - unsure why not on statins   - supportive care   - PT/OT evaluation done, recommend SNF     Seizure disorder  - resume keppra    UTI- enterococcus and pseudomonas    - Was on Merrem and then changed to Vanc   - Urine Cx grew pseudomonas-low colony count and enterococcus which I will treat-- he was started on

## 2019-06-25 NOTE — PLAN OF CARE
Problem: Infection:  Goal: Will remain free from infection  Description  Will remain free from infection  Outcome: Ongoing     Problem: Safety:  Goal: Free from accidental physical injury  Description  Free from accidental physical injury  6/25/2019 0604 by Alycia Philippe RN  Outcome: Ongoing  6/24/2019 1722 by Jaclyn Low RN  Outcome: Ongoing     Problem: Daily Care:  Goal: Daily care needs are met  Description  Daily care needs are met  6/25/2019 0604 by Alycia Philippe RN  Outcome: Ongoing  6/24/2019 1722 by Jaclyn Low RN  Outcome: Ongoing     Problem: Pain:  Goal: Patient's pain/discomfort is manageable  Description  Patient's pain/discomfort is manageable  Outcome: Ongoing  Goal: Pain level will decrease  Description  Pain level will decrease  6/25/2019 0604 by Alycia Philippe RN  Outcome: Ongoing  6/24/2019 1722 by Jaclyn Low RN  Outcome: Ongoing  Goal: Control of acute pain  Description  Control of acute pain  Outcome: Ongoing  Goal: Control of chronic pain  Description  Control of chronic pain  Outcome: Ongoing     Problem: Skin Integrity:  Goal: Skin integrity will stabilize  Description  Skin integrity will stabilize  6/25/2019 0604 by Alycia Philippe RN  Outcome: Ongoing  6/24/2019 1722 by Jaclyn Low RN  Outcome: Ongoing     Problem: Discharge Planning:  Goal: Patients continuum of care needs are met  Description  Patients continuum of care needs are met  6/25/2019 0604 by Alycia Philippe RN  Outcome: Ongoing  6/24/2019 1722 by Jaclyn Low RN  Outcome: Ongoing     Problem: Falls - Risk of:  Goal: Will remain free from falls  Description  Will remain free from falls  6/25/2019 0604 by Alycia Philippe RN  Outcome: Ongoing  6/24/2019 1722 by Jaclyn Low RN  Outcome: Ongoing  Goal: Absence of physical injury  Description  Absence of physical injury  Outcome: Ongoing     Problem: Sensory:  Goal: General experience of comfort will improve  Description  General experience of comfort will improve  Outcome: Ongoing     Problem: Urinary Elimination:  Goal: Signs and symptoms of infection will decrease  Description  Signs and symptoms of infection will decrease  6/25/2019 0604 by Lanette Batista RN  Outcome: Ongoing  6/24/2019 1722 by Alexandr Do RN  Outcome: Ongoing  Goal: Ability to reestablish a normal urinary elimination pattern will improve - after catheter removal  Description  Ability to reestablish a normal urinary elimination pattern will improve  Outcome: Ongoing  Goal: Complications related to the disease process, condition or treatment will be avoided or minimized  Description  Complications related to the disease process, condition or treatment will be avoided or minimized  Outcome: Ongoing     Problem: Risk for Impaired Skin Integrity  Goal: Tissue integrity - skin and mucous membranes  Description  Structural intactness and normal physiological function of skin and  mucous membranes.   Outcome: Ongoing     Problem: Confusion - Acute:  Goal: Absence of continued neurological deterioration signs and symptoms  Description  Absence of continued neurological deterioration signs and symptoms  Outcome: Ongoing  Goal: Mental status will be restored to baseline  Description  Mental status will be restored to baseline  Outcome: Ongoing     Problem: Discharge Planning:  Goal: Ability to perform activities of daily living will improve  Description  Ability to perform activities of daily living will improve  Outcome: Ongoing  Goal: Participates in care planning  Description  Participates in care planning  Outcome: Ongoing     Problem: Injury - Risk of, Physical Injury:  Goal: Will remain free from falls  Description  Will remain free from falls  6/25/2019 0604 by Lanette Batista RN  Outcome: Ongoing  6/24/2019 1722 by Alexandr Do RN  Outcome: Ongoing  Goal: Absence of physical injury  Description  Absence of physical injury  Outcome: Ongoing     Problem: Mood - Altered:  Goal: Mood stable  Description  Mood stable  Outcome: Ongoing  Goal: Absence of abusive behavior  Description  Absence of abusive behavior  Outcome: Ongoing  Goal: Verbalizations of feeling emotionally comfortable while being cared for will increase  Description  Verbalizations of feeling emotionally comfortable while being cared for will increase  Outcome: Ongoing     Problem: Psychomotor Activity - Altered:  Goal: Absence of psychomotor disturbance signs and symptoms  Description  Absence of psychomotor disturbance signs and symptoms  Outcome: Ongoing     Problem: Sensory Perception - Impaired:  Goal: Demonstrations of improved sensory functioning will increase  Description  Demonstrations of improved sensory functioning will increase  Outcome: Ongoing  Goal: Decrease in sensory misperception frequency  Description  Decrease in sensory misperception frequency  Outcome: Ongoing  Goal: Able to refrain from responding to false sensory perceptions  Description  Able to refrain from responding to false sensory perceptions  Outcome: Ongoing  Goal: Demonstrates accurate environmental perceptions  Description  Demonstrates accurate environmental perceptions  Outcome: Ongoing  Goal: Able to distinguish between reality-based and nonreality-based thinking  Description  Able to distinguish between reality-based and nonreality-based thinking  Outcome: Ongoing  Goal: Able to interrupt nonreality-based thinking  Description  Able to interrupt nonreality-based thinking  Outcome: Ongoing     Problem: Sleep Pattern Disturbance:  Goal: Appears well-rested  Description  Appears well-rested  Outcome: Ongoing

## 2019-06-25 NOTE — PROGRESS NOTES
Occupational Therapy Daily Treatment Note    Unit: [facility]  Date:  6/25/2019  Patient Name:    Jacki Chawla  Admitting diagnosis:  Deep venous thrombosis of pelvic vein [I82.90]  Admit Date:  6/18/2019  Precautions/Restrictions:  fall risk and telesitter      Discharge Recommendations: SNF  DME needs for discharge: defer to facility       Therapy recommendations for staff:   Assist of 1 with use of No AD for rolling in bed     AM-PAC Score: 10  Home Health S4 Level: NA       Treatment Time:  2549-3282  Treatment number:  2  Total Treatment Time:  10 minutes      Subjective: pt cooperative with rolling and changing depends and cleaning up after bowel  Movement. When attempts were made for sitting on the edge of the bed the pt was swinging  Left UE at therapist and appeared agitated     Pain  With wiping pt from having a bowel movement     Bed Mobility:   Supine to Sit:  Not Tested  Sit to Supine:  Not Tested  Rolling:           Min A to the right and mod assist to the left   Scooting:        Min A to the head of the bed     Transfer Training:   NT- pt was agitiated swinging UEs and shaking his head no when ask to let the therapist assist pt to sit on the edge of the bed/     Activity Tolerance   Pt completed therapy session with agitation   ADL Training:     Grooming/Hygiene:  Min A to assist with washing the left hand     Therapeutic Exercise:   N/A    Patient Education:   Role of OT    Positioning Needs: In bed, call light and needs in reach. Family Present:  No    Assessment:   Pt agreeable to have his depends changed and get cleaned up and performed rolling in bed with assist and was able to assist scooting to the head of the bed. The pt did assist with washing the left hand with min assist. The pt then got agitated with therapist swinging left UE at the therapist when ask to sit on the edge of the bed with the therapist assist.     Markos Genao be met in 3 Visits:  1). Bed to toilet/BSC:  Max A     To be met in 5 Visits:  1). Supine to Sit: Mod A  2). Upper Body Bathing: Mod A  3). Lower Body Bathing:  Max A  4). Upper Body Dressing: Mod A  5). Lower Body Dressing: Max A  6).  Pt to romy UE exs x 15 reps          Plan: cont with POC  Mayte Breen OTR/L [id number]      If patient discharges from this facility prior to next visit, this note will serve as the Discharge Summary

## 2019-06-25 NOTE — PLAN OF CARE
Problem: Infection:  Goal: Will remain free from infection  Description  Will remain free from infection  6/25/2019 1234 by Ulysses Hensley RN  Outcome: Ongoing  6/25/2019 0604 by Pati Marroquin RN  Outcome: Ongoing     Problem: Safety:  Goal: Free from accidental physical injury  Description  Free from accidental physical injury  6/25/2019 1234 by Ulysses Hensley RN  Outcome: Ongoing  6/25/2019 0604 by Pati Marroquin RN  Outcome: Ongoing     Problem: Daily Care:  Goal: Daily care needs are met  Description  Daily care needs are met  6/25/2019 1234 by Ulysses Hensley RN  Outcome: Ongoing  6/25/2019 0604 by Pati Marroquin RN  Outcome: Ongoing     Problem: Pain:  Description  Pain management should include both nonpharmacologic and pharmacologic interventions.   Goal: Patient's pain/discomfort is manageable  Description  Patient's pain/discomfort is manageable  6/25/2019 1234 by Ulysses Hensley RN  Outcome: Ongoing  6/25/2019 0604 by Pati Marroquin RN  Outcome: Ongoing  Goal: Pain level will decrease  Description  Pain level will decrease  6/25/2019 1234 by Ulysses Hensley RN  Outcome: Ongoing  6/25/2019 0604 by Pati Marroquin RN  Outcome: Ongoing  Goal: Control of acute pain  Description  Control of acute pain  6/25/2019 1234 by Ulysses Hensley RN  Outcome: Ongoing  6/25/2019 0604 by Pati Marroquin RN  Outcome: Ongoing  Goal: Control of chronic pain  Description  Control of chronic pain  6/25/2019 1234 by Ulysses Hensley RN  Outcome: Ongoing  6/25/2019 0604 by Pati Marroquin RN  Outcome: Ongoing     Problem: Skin Integrity:  Goal: Skin integrity will stabilize  Description  Skin integrity will stabilize  6/25/2019 1234 by Ulysses Hensley RN  Outcome: Ongoing  6/25/2019 0604 by Pati Marroquin RN  Outcome: Ongoing     Problem: Discharge Planning:  Goal: Patients continuum of care needs are met  Description  Patients continuum of care needs are met  6/25/2019 1234 by Ulysses Hensley RN  Outcome: Ongoing  6/25/2019 0604 by Varsha Greene RN  Outcome: Ongoing     Problem: Falls - Risk of:  Goal: Will remain free from falls  Description  Will remain free from falls  6/25/2019 1234 by Jameel Good RN  Outcome: Ongoing  6/25/2019 0604 by Varsha Greene RN  Outcome: Ongoing  Goal: Absence of physical injury  Description  Absence of physical injury  6/25/2019 1234 by Jameel Good RN  Outcome: Ongoing  6/25/2019 0604 by Varsha Greene RN  Outcome: Ongoing     Problem: Sensory:  Goal: General experience of comfort will improve  Description  General experience of comfort will improve  6/25/2019 1234 by Jameel Good RN  Outcome: Ongoing  6/25/2019 0604 by Varsha Greene RN  Outcome: Ongoing     Problem: Urinary Elimination:  Goal: Signs and symptoms of infection will decrease  Description  Signs and symptoms of infection will decrease  6/25/2019 1234 by Jameel Good RN  Outcome: Ongoing  6/25/2019 0604 by Varsha Greene RN  Outcome: Ongoing  Goal: Ability to reestablish a normal urinary elimination pattern will improve - after catheter removal  Description  Ability to reestablish a normal urinary elimination pattern will improve  6/25/2019 1234 by Jameel Good RN  Outcome: Ongoing  6/25/2019 0604 by Varsha Greene RN  Outcome: Ongoing  Goal: Complications related to the disease process, condition or treatment will be avoided or minimized  Description  Complications related to the disease process, condition or treatment will be avoided or minimized  6/25/2019 1234 by Jameel Good RN  Outcome: Ongoing  6/25/2019 0604 by Varsha Greene RN  Outcome: Ongoing     Problem: Risk for Impaired Skin Integrity  Goal: Tissue integrity - skin and mucous membranes  Description  Structural intactness and normal physiological function of skin and  mucous membranes.   6/25/2019 0604 by Varsha Greene RN  Outcome: Ongoing     Problem: Confusion - Acute:  Goal: Absence of continued neurological deterioration signs and symptoms  Description  Absence of continued neurological deterioration signs and symptoms  6/25/2019 1234 by Raul Ontiveros RN  Outcome: Ongoing  6/25/2019 0604 by Sandro Lopez RN  Outcome: Ongoing  Goal: Mental status will be restored to baseline  Description  Mental status will be restored to baseline  6/25/2019 1234 by Raul Ontiveros RN  Outcome: Ongoing  6/25/2019 0604 by Sandro Lopez RN  Outcome: Ongoing     Problem: Discharge Planning:  Goal: Ability to perform activities of daily living will improve  Description  Ability to perform activities of daily living will improve  6/25/2019 1234 by Raul Ontiveros RN  Outcome: Ongoing  6/25/2019 0604 by Sandro Lopez RN  Outcome: Ongoing  Goal: Participates in care planning  Description  Participates in care planning  6/25/2019 1234 by Raul Ontiveros RN  Outcome: Ongoing  6/25/2019 0604 by Sandro Lopez RN  Outcome: Ongoing     Problem: Injury - Risk of, Physical Injury:  Goal: Will remain free from falls  Description  Will remain free from falls  6/25/2019 1234 by Raul Ontiveros RN  Outcome: Ongoing  6/25/2019 0604 by Sandro Lopez RN  Outcome: Ongoing  Goal: Absence of physical injury  Description  Absence of physical injury  6/25/2019 1234 by Raul Ontiveros RN  Outcome: Ongoing  6/25/2019 0604 by Sandro Lopez RN  Outcome: Ongoing     Problem: Mood - Altered:  Goal: Mood stable  Description  Mood stable  6/25/2019 1234 by Raul Ontiveros RN  Outcome: Ongoing  6/25/2019 0604 by Sandro Lopez RN  Outcome: Ongoing  Goal: Absence of abusive behavior  Description  Absence of abusive behavior  6/25/2019 1234 by Raul Ontiveros RN  Outcome: Ongoing  6/25/2019 0604 by Sandro Lopez RN  Outcome: Ongoing  Goal: Verbalizations of feeling emotionally comfortable while being cared for will increase  Description  Verbalizations of feeling emotionally comfortable while being cared for will increase  6/25/2019 1234 by Raul Ontiveros RN  Outcome: Ongoing  6/25/2019 0604 by Daljit Longoria RN  Outcome: Ongoing     Problem: Psychomotor Activity - Altered:  Goal: Absence of psychomotor disturbance signs and symptoms  Description  Absence of psychomotor disturbance signs and symptoms  6/25/2019 1234 by Amaury Hinson RN  Outcome: Ongoing  6/25/2019 0604 by Daljit Longoria RN  Outcome: Ongoing     Problem: Sensory Perception - Impaired:  Goal: Demonstrations of improved sensory functioning will increase  Description  Demonstrations of improved sensory functioning will increase  6/25/2019 1234 by Amaury Hinson RN  Outcome: Ongoing  6/25/2019 0604 by Daljit Longoria RN  Outcome: Ongoing  Goal: Decrease in sensory misperception frequency  Description  Decrease in sensory misperception frequency  6/25/2019 1234 by Amaury Hinson RN  Outcome: Ongoing  6/25/2019 0604 by Daljit Longoria RN  Outcome: Ongoing  Goal: Able to refrain from responding to false sensory perceptions  Description  Able to refrain from responding to false sensory perceptions  6/25/2019 1234 by Amaury Hinson RN  Outcome: Ongoing  6/25/2019 0604 by Daljit Longoria RN  Outcome: Ongoing  Goal: Demonstrates accurate environmental perceptions  Description  Demonstrates accurate environmental perceptions  6/25/2019 1234 by Amaury Hinson RN  Outcome: Ongoing  6/25/2019 0604 by Daljit Longoria RN  Outcome: Ongoing  Goal: Able to distinguish between reality-based and nonreality-based thinking  Description  Able to distinguish between reality-based and nonreality-based thinking  6/25/2019 1234 by Amaury Hinson RN  Outcome: Ongoing  6/25/2019 0604 by Daljit Longoria RN  Outcome: Ongoing  Goal: Able to interrupt nonreality-based thinking  Description  Able to interrupt nonreality-based thinking  6/25/2019 1234 by Amaury Hinson RN  Outcome: Ongoing  6/25/2019 0604 by Daljit Longoria RN  Outcome: Ongoing     Problem: Sleep Pattern Disturbance:  Goal: Appears well-rested  Description  Appears well-rested  6/25/2019 1234 by Brianna Escobedo RN  Outcome: Ongoing  6/25/2019 0604 by Coralie Castleman, RN  Outcome: Ongoing
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
## 2019-06-26 ENCOUNTER — TELEPHONE (OUTPATIENT)
Dept: FAMILY MEDICINE CLINIC | Age: 59
End: 2019-06-26

## 2019-06-26 NOTE — TELEPHONE ENCOUNTER
[facility] Transitions Initial Follow Up Call    Outreach made within 2 business days of discharge: Yes    Patient: Halie Zhu Patient : 1960   MRN: [id number]  Reason for Admission: There are no discharge diagnoses documented for the most recent discharge. Discharge Date: 19       Spoke with: Jeannette Michele     Discharge department/facility:     [facility] Interactive Patient Contact:  Was patient able to fill all prescriptions: Yes  Was patient instructed to bring all medications to the follow-up visit: Yes  Is patient taking all medications as directed in the discharge summary? Yes  Does patient understand their discharge instructions: Yes  Does patient have questions or concerns that need addressed prior to 7-14 day follow up office visit: no    Scheduled appointment with PCP within 7-14 days    Follow Up  No future appointments. Candace Romero MA       Patient is in a facility at this time and POA actually wants to find a pcp closer but will call back for follow up appointment once patient is able to be released.
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
## 2019-07-16 ENCOUNTER — TELEPHONE (OUTPATIENT)
Dept: FAMILY MEDICINE CLINIC | Age: 59
End: 2019-07-16

## 2019-07-17 NOTE — TELEPHONE ENCOUNTER
Left v/m for RADHA letting him know. If he wants to schedule patient please make a 40 min appointment.

## 2019-07-19 PROBLEM — N39.0 UTI (URINARY TRACT INFECTION): Status: RESOLVED | Noted: 2019-06-19 | Resolved: 2019-07-19

## 2020-06-18 ENCOUNTER — HOSPITAL ENCOUNTER (INPATIENT)
Age: 60
LOS: 6 days | Discharge: HOME | DRG: 884 | End: 2020-06-24
Attending: PSYCHIATRY & NEUROLOGY | Admitting: PSYCHIATRY & NEUROLOGY
Payer: MEDICARE

## 2020-06-18 PROBLEM — F29 PSYCHOSIS (HCC): Status: ACTIVE | Noted: 2020-06-18

## 2020-06-18 PROCEDURE — 93005 ELECTROCARDIOGRAM TRACING: CPT

## 2020-06-18 PROCEDURE — 1240000000 HC EMOTIONAL WELLNESS R&B

## 2020-06-19 LAB
EKG ATRIAL RATE: 72 BPM
EKG DIAGNOSIS: NORMAL
EKG P AXIS: 46 DEGREES
EKG P-R INTERVAL: 152 MS
EKG Q-T INTERVAL: 390 MS
EKG QRS DURATION: 92 MS
EKG QTC CALCULATION (BAZETT): 427 MS
EKG R AXIS: 16 DEGREES
EKG T AXIS: 15 DEGREES
EKG VENTRICULAR RATE: 72 BPM
SARS-COV-2, NAAT: NOT DETECTED

## 2020-06-19 PROCEDURE — 97535 SELF CARE MNGMENT TRAINING: CPT

## 2020-06-19 PROCEDURE — 97530 THERAPEUTIC ACTIVITIES: CPT

## 2020-06-19 PROCEDURE — 6370000000 HC RX 637 (ALT 250 FOR IP): Performed by: PSYCHIATRY & NEUROLOGY

## 2020-06-19 PROCEDURE — 93005 ELECTROCARDIOGRAM TRACING: CPT

## 2020-06-19 PROCEDURE — 99222 1ST HOSP IP/OBS MODERATE 55: CPT | Performed by: PHYSICIAN ASSISTANT

## 2020-06-19 PROCEDURE — 1240000000 HC EMOTIONAL WELLNESS R&B

## 2020-06-19 PROCEDURE — U0002 COVID-19 LAB TEST NON-CDC: HCPCS

## 2020-06-19 PROCEDURE — 97166 OT EVAL MOD COMPLEX 45 MIN: CPT

## 2020-06-19 PROCEDURE — 99223 1ST HOSP IP/OBS HIGH 75: CPT | Performed by: NURSE PRACTITIONER

## 2020-06-19 RX ORDER — TRAZODONE HYDROCHLORIDE 50 MG/1
25 TABLET ORAL NIGHTLY PRN
Status: DISCONTINUED | OUTPATIENT
Start: 2020-06-19 | End: 2020-06-24 | Stop reason: HOSPADM

## 2020-06-19 RX ORDER — LORAZEPAM 0.5 MG/1
0.5 TABLET ORAL EVERY 6 HOURS PRN
Status: DISCONTINUED | OUTPATIENT
Start: 2020-06-19 | End: 2020-06-24 | Stop reason: HOSPADM

## 2020-06-19 RX ORDER — TRAMADOL HYDROCHLORIDE 50 MG/1
50 TABLET ORAL EVERY 6 HOURS PRN
Status: DISCONTINUED | OUTPATIENT
Start: 2020-06-19 | End: 2020-06-24 | Stop reason: HOSPADM

## 2020-06-19 RX ORDER — GABAPENTIN 100 MG/1
100 CAPSULE ORAL 3 TIMES DAILY
Status: DISCONTINUED | OUTPATIENT
Start: 2020-06-19 | End: 2020-06-24 | Stop reason: HOSPADM

## 2020-06-19 RX ORDER — ACETAMINOPHEN 325 MG/1
650 TABLET ORAL EVERY 4 HOURS PRN
Status: DISCONTINUED | OUTPATIENT
Start: 2020-06-19 | End: 2020-06-24 | Stop reason: HOSPADM

## 2020-06-19 RX ORDER — FINASTERIDE 5 MG/1
5 TABLET, FILM COATED ORAL DAILY
Status: DISCONTINUED | OUTPATIENT
Start: 2020-06-19 | End: 2020-06-24 | Stop reason: HOSPADM

## 2020-06-19 RX ORDER — HALOPERIDOL 5 MG/ML
2 INJECTION INTRAMUSCULAR EVERY 6 HOURS PRN
Status: DISCONTINUED | OUTPATIENT
Start: 2020-06-19 | End: 2020-06-24 | Stop reason: HOSPADM

## 2020-06-19 RX ORDER — MAGNESIUM HYDROXIDE/ALUMINUM HYDROXICE/SIMETHICONE 120; 1200; 1200 MG/30ML; MG/30ML; MG/30ML
30 SUSPENSION ORAL EVERY 6 HOURS PRN
Status: DISCONTINUED | OUTPATIENT
Start: 2020-06-19 | End: 2020-06-24 | Stop reason: HOSPADM

## 2020-06-19 RX ORDER — LORAZEPAM 2 MG/ML
1 INJECTION INTRAMUSCULAR EVERY 6 HOURS PRN
Status: DISCONTINUED | OUTPATIENT
Start: 2020-06-19 | End: 2020-06-24 | Stop reason: HOSPADM

## 2020-06-19 RX ORDER — BENZTROPINE MESYLATE 1 MG/ML
1 INJECTION INTRAMUSCULAR; INTRAVENOUS 2 TIMES DAILY PRN
Status: DISCONTINUED | OUTPATIENT
Start: 2020-06-19 | End: 2020-06-24 | Stop reason: HOSPADM

## 2020-06-19 RX ORDER — HALOPERIDOL 1 MG/1
2 TABLET ORAL EVERY 6 HOURS PRN
Status: DISCONTINUED | OUTPATIENT
Start: 2020-06-19 | End: 2020-06-24 | Stop reason: HOSPADM

## 2020-06-19 RX ORDER — LEVETIRACETAM 500 MG/1
500 TABLET ORAL 2 TIMES DAILY
Status: DISCONTINUED | OUTPATIENT
Start: 2020-06-19 | End: 2020-06-24 | Stop reason: HOSPADM

## 2020-06-19 RX ADMIN — TRAMADOL HYDROCHLORIDE 50 MG: 50 TABLET, FILM COATED ORAL at 06:05

## 2020-06-19 ASSESSMENT — PAIN SCALES - GENERAL
PAINLEVEL_OUTOF10: 0
PAINLEVEL_OUTOF10: 10
PAINLEVEL_OUTOF10: 0
PAINLEVEL_OUTOF10: 4
PAINLEVEL_OUTOF10: 0

## 2020-06-19 ASSESSMENT — PAIN DESCRIPTION - PROGRESSION: CLINICAL_PROGRESSION: NOT CHANGED

## 2020-06-19 ASSESSMENT — SLEEP AND FATIGUE QUESTIONNAIRES
DO YOU HAVE DIFFICULTY SLEEPING: NO
DO YOU USE A SLEEP AID: NO

## 2020-06-19 ASSESSMENT — PAIN - FUNCTIONAL ASSESSMENT: PAIN_FUNCTIONAL_ASSESSMENT: PREVENTS OR INTERFERES SOME ACTIVE ACTIVITIES AND ADLS

## 2020-06-19 ASSESSMENT — PAIN DESCRIPTION - LOCATION: LOCATION: GENERALIZED

## 2020-06-19 ASSESSMENT — PAIN DESCRIPTION - PAIN TYPE: TYPE: CHRONIC PAIN

## 2020-06-19 ASSESSMENT — PAIN DESCRIPTION - FREQUENCY: FREQUENCY: CONTINUOUS

## 2020-06-19 ASSESSMENT — PAIN DESCRIPTION - ONSET: ONSET: ON-GOING

## 2020-06-19 ASSESSMENT — PAIN DESCRIPTION - ORIENTATION: ORIENTATION: RIGHT

## 2020-06-19 ASSESSMENT — LIFESTYLE VARIABLES: HISTORY_ALCOHOL_USE: COMMENT

## 2020-06-19 ASSESSMENT — PAIN DESCRIPTION - DESCRIPTORS: DESCRIPTORS: ACHING

## 2020-06-19 ASSESSMENT — PAIN DESCRIPTION - DIRECTION: RADIATING_TOWARDS: NO
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
## 2020-06-19 NOTE — BH NOTE
Patient Medications Collected?: Not Applicable  Other Valuables: None(please see inventory sheet for details)     Valuables sent home with N/A. Valuables placed in safe in security envelope, number:  Patient's home medications were N/A  Patient oriented to surroundings and program expectations and copy of patient rights given. Received admission packet: Yes. Consents reviewed, signed NO. Refused Yes.  Patient verbalize understanding: No.    Patient education on precautions: Larissa Sigala RN

## 2020-06-19 NOTE — BH NOTE
Patient is not able to demonstrate the ability to move from a reclining position to an upright position within the recliner due to right sided weakness/flaccid.
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
## 2020-06-19 NOTE — H&P
occlusion with cerebral infarction ([facility]     Kidney stone     Pulmonary embolism ([facility]      Home Medications:  Prior to Admission medications    Medication Sig Start Date End Date Taking? Authorizing Provider   apixaban (ELIQUIS) 5 MG TABS tablet Take 1 tablet by mouth 2 times daily 6/28/19   Yandel Badillo MD   levETIRAcetam (KEPPRA) 500 MG tablet Take 500 mg by mouth 2 times daily    Historical Provider, MD   finasteride (PROSCAR) 5 MG tablet Take 5 mg by mouth daily    Historical Provider, MD   tamsulosin (FLOMAX) 0.4 MG capsule Take 0.4 mg by mouth 3/20/18   Historical Provider, MD     Chemical Dependency History:   Tobacco: Per Epic, former smoker  Alcohol: Per Epic, none  Illicit: Per Epic, none     Family Hx:    None known    Social Hx:   Patient's social history is widely unknown. Chart review indicates that he is single. He reportedly has children but it is unclear as to how many Cesar Dc is listed as child under emergency contacts). He has been residing at [facility] but was recently discharged. Plan was to return to daughter's home, however, patient became upset with this decision. He is disabled. No known legal issues. No known abuse/trauma.     Current Medications Ordered:   apixaban  5 mg Oral BID    finasteride  5 mg Oral Daily    levETIRAcetam  500 mg Oral BID    gabapentin  100 mg Oral TID      PRN Meds: acetaminophen, LORazepam **OR** LORazepam, haloperidol lactate **OR** haloperidol, traZODone, benztropine mesylate, magnesium hydroxide, aluminum & magnesium hydroxide-simethicone, traMADol     ROS:    Review of Systems   Unable to perform ROS: Patient nonverbal   Aphasic s/p stroke 2017    PE:    /87   Pulse 72   Temp 96.4 °F (35.8 °C) (Oral)   Resp 16   Ht 5' 7\" (1.702 m)   Wt 130 lb (59 kg)   SpO2 98%   BMI 20.36 kg/m²       Motor / Gait: Bed bound, R sided weakness, no involuntary movements    Mental Status Examination:    Appearance: WM, appears stated age, statin?  - Will get a communication card from OT to help better understand patients needs     Seizure DO   - Has been on Keppra in the past but reports from SNF that patient has been refusing medication so they discontinued   - Will re-order and encourage patient to resume this medication      BPH  - Continue Proscar    Code Status: Full    Disposition:    - Housing: Had been living at [facility] but recently discharged to daughter's home  - Current outpatient follow-up: None known    Estimated Length of Stay: 7-10 days    Criteria for Discharge:  Not psychotic, not homicidal, not suicidal, behavioral disturbance under control, sleeping well, mood improved/stable, eating well, aftercare arranged. Spent >70 minutes face to face with patient of which >50% was spent counseling and providing education regarding diagnosis, treatment options, and prognosis.     Cindy Albright, MPH, APRN, PMHNP-BC  06/19/20

## 2020-06-19 NOTE — PROGRESS NOTES
Patient admitted to room 2207. Oriented to room and unit. Patient is aphasic with right sided weakness but able to make needs known by using hand gestures/pointing. Incontinent of bladder upon arrival. Requires total care with ADL's. Respiration easy and even. Will continue to monitor.

## 2020-06-20 LAB
CHOLESTEROL, TOTAL: 184 MG/DL (ref 0–199)
HDLC SERPL-MCNC: 41 MG/DL (ref 40–60)
LDL CHOLESTEROL CALCULATED: 130 MG/DL
TRIGL SERPL-MCNC: 64 MG/DL (ref 0–150)
VLDLC SERPL CALC-MCNC: 13 MG/DL

## 2020-06-20 PROCEDURE — 6370000000 HC RX 637 (ALT 250 FOR IP): Performed by: PHYSICIAN ASSISTANT

## 2020-06-20 PROCEDURE — 80061 LIPID PANEL: CPT

## 2020-06-20 PROCEDURE — 97112 NEUROMUSCULAR REEDUCATION: CPT

## 2020-06-20 PROCEDURE — 99233 SBSQ HOSP IP/OBS HIGH 50: CPT | Performed by: PSYCHIATRY & NEUROLOGY

## 2020-06-20 PROCEDURE — 83036 HEMOGLOBIN GLYCOSYLATED A1C: CPT

## 2020-06-20 PROCEDURE — 36415 COLL VENOUS BLD VENIPUNCTURE: CPT

## 2020-06-20 PROCEDURE — 6370000000 HC RX 637 (ALT 250 FOR IP): Performed by: PSYCHIATRY & NEUROLOGY

## 2020-06-20 PROCEDURE — 1240000000 HC EMOTIONAL WELLNESS R&B

## 2020-06-20 PROCEDURE — 97530 THERAPEUTIC ACTIVITIES: CPT

## 2020-06-20 PROCEDURE — 97162 PT EVAL MOD COMPLEX 30 MIN: CPT

## 2020-06-20 RX ADMIN — TRAMADOL HYDROCHLORIDE 50 MG: 50 TABLET, FILM COATED ORAL at 13:45

## 2020-06-20 ASSESSMENT — PAIN SCALES - GENERAL
PAINLEVEL_OUTOF10: 0
PAINLEVEL_OUTOF10: 8
PAINLEVEL_OUTOF10: 0

## 2020-06-20 ASSESSMENT — PAIN SCALES - WONG BAKER: WONGBAKER_NUMERICALRESPONSE: 8

## 2020-06-20 ASSESSMENT — PAIN DESCRIPTION - LOCATION: LOCATION: BACK

## 2020-06-20 NOTE — PROGRESS NOTES
Weak                                                hams          Deferred  Weak                                               iliopsoas   Deferred  Weak                  LEFT LE   quads     3-     ant tibs       3     hams         2+    iliopsoas    3-  Sensation       Light touch:      NT N/A  Proprioception:    NT N/A    Coordination Testing: Alternating pronation/supination:  Impaired  Heel to shin (sitting or supine): Impaired  Alternating Toe Tapping:       Impaired    Tone     Hypertonia R UE and R LE    Trunk Control   Good    Vision:   WNL  Comments:     Hearing:   WFL  Comments:     Balance  Static Sitting:  Good -   Dynamic Sitting: Fair +  Static Standing:  Poor  Dynamic Standing: Poor -     Balance Functional Outcome Measure   NA  Measure Used:  N/A  Date Assessed: N/A  Score: N/A    Indication for Romberg: postive test = proprioceptive deficit  Indications for CHAMBERS: Risk of Falls []41-55 = low, []21-40 = medium, []0-20 = high   Indications for Tinetti: Risk of Falls:   []Low (> 24)   []Mod (19-23)   []High (< 18)  Indications for DGI:  [] minimal to no risk (> 21), [] risk of falls (<21)    Bed mobility    Rolling to left:    Min A  Rolling to right:   Min A  Scooting in supine:   Min A  Scooting in sitting:   Min A  Supine to sit with HOB flat:  Min A  Sit to supine with HOB flat:  Min A    Transfers    Sit to stand: Max A of 2  Stand to sit: Max A of 2  Bed to chair:  Max A of 2 with squat pivot technique. Patient was unable to use Stedy for performing sit to stand transfers. Toileting  Sit>stand from toilet (standard height):  Not Tested  Stand>sit to toilet (standard height):   Not Tested  Gilma-care:      Not Tested  Hand hygiene:     Not Tested  LE pants management:   Not Tested  Cues:    Gait gait deferred due to difficulty with transfers; pt ambulated 0 ft.      Stair Training deferred, pt unsafe/ not appropriate to complete stairs at this time      WC mobility   NA    Activity support, inability to negotiate stairs to enter home/bedroom/bathroom, burden of care beyond caregiver ability, home environment not conducive to patient recovery and limited safety awareness. Goals :   Patient Goals for Therapy: unable to state goals. To be met in 3 visits:  1). Demonstrate improved safety awareness with functional mobility requiring less overall cueing. To be met in 6 visits:  1). Supine to/from sit  SBA to promote return to functional ADLs  2). Sit to/from stand Mod A of 2 to promote return to functional ADLs  3). Gait: Ambulate 5 ft.  with Max A of 2 and use of LRAD (least restrictive assistive device) demonstrating improved gait pattern  4). Tolerate B LE exercises 3 sets of 10-15 reps to improve strength   5). Tolerated standing balance exercises with improved balance to Fair - grade    Rehabilitation Potential    Fair  Strengths for achieving goals include:   Pt cooperative  Barriers to achieving goals include:    Cognitive deficits      Plan    To be seen 2-5x/week while in [facility] setting for   Therapeutic Exercise, Neuromuscular Re-Education, Gait training and Therapeutic Activity     Timed Code Treatment Minutes: 30 minutes + Eval  Total Treatment Time: 40 minutes    Electronically signed by Lew Carlos PT on 6/20/20 at 5:25 PM EDT      If patient discharges from this facility prior to next visit, this note will serve as the Discharge Summary.

## 2020-06-21 LAB
ESTIMATED AVERAGE GLUCOSE: 119.8 MG/DL
HBA1C MFR BLD: 5.8 %

## 2020-06-21 PROCEDURE — 6370000000 HC RX 637 (ALT 250 FOR IP): Performed by: PHYSICIAN ASSISTANT

## 2020-06-21 PROCEDURE — 6370000000 HC RX 637 (ALT 250 FOR IP): Performed by: PSYCHIATRY & NEUROLOGY

## 2020-06-21 PROCEDURE — 1240000000 HC EMOTIONAL WELLNESS R&B

## 2020-06-21 RX ADMIN — TRAMADOL HYDROCHLORIDE 50 MG: 50 TABLET, FILM COATED ORAL at 08:55

## 2020-06-21 ASSESSMENT — PAIN SCALES - GENERAL
PAINLEVEL_OUTOF10: 0
PAINLEVEL_OUTOF10: 3
PAINLEVEL_OUTOF10: 0
PAINLEVEL_OUTOF10: 3
PAINLEVEL_OUTOF10: 0
PAINLEVEL_OUTOF10: 3
PAINLEVEL_OUTOF10: 0
PAINLEVEL_OUTOF10: 3
PAINLEVEL_OUTOF10: 6

## 2020-06-21 ASSESSMENT — PAIN DESCRIPTION - LOCATION: LOCATION: BACK

## 2020-06-21 NOTE — PROGRESS NOTES
Department of Psychiatry  Attending Progress Note  Chief Complaint: follow-up psychosis    Patient's chart was reviewed and collaborated with  about the treatment plan. SUBJECTIVE:    Patient is feeling unchanged. Suicidal ideation:  denies suicidal ideation. Patient does not have medication side effects. Pt reported feeling sad and been in a lot of pain at this time. He also reported that he needed to be change because her was wet. He was also able to verbalized that he was thirsty. He denied voices and did not appear to be rtis. ROS: Patient has new complaints: no  Sleeping adequately:  Yes   Appetite adequate: Yes  Attending groups: No: in room  Visitors:No    OBJECTIVE    Physical  VITALS:  /81   Pulse 72   Temp 97.5 °F (36.4 °C) (Oral)   Resp 16   Ht 5' 7\" (1.702 m)   Wt 130 lb (59 kg)   SpO2 95%   BMI 20.36 kg/m²      Mental Status Examination:    Appearance: WM, appears stated age, wearing hospital attire, good grooming and hygiene  Behavior/Attitude Toward Examiner: Cooperative to the best of his ability, good eye contact  Speech: Minimal, expressive aphasia s/p stroke 2017. Point to his wants and needs.    Mood: sad  Affect: Mood congruent   Thought Processes: Difficult to full assess due to expressive aphasia,   Thought Content: Difficult to full assess due to expressive aphasia, did not appear to be paranoid  Perceptions: Difficult to full assess due to expressive aphasia, did not appear to be RTIS  Attention: Impaired  Cognition: Difficult to full assess due to expressive aphasia  Insight: Difficult to full assess due to expressive aphasia  Judgment: Difficult to full assess due to expressive aphasia        Data  Labs:   Admission on 06/18/2020   Component Date Value Ref Range Status    SARS-CoV-2, NAAT 06/19/2020 Not Detected  Not Detected Final    Comment: Rapid NAAT:   Negative results should be treated as presumptive and,  if inconsistent with clinical signs and symptoms or necessary for  patient management, should be tested with an alternative molecular  assay. Negative results do not preclude SARS-CoV-2 infection and  should not be used as the sole basis for patient management decisions. This test has been authorized by the FDA under an Emergency Use  Authorization (EUA) for use by authorized laboratories.     Fact sheet for Healthcare Providers:  BuildHer.es  Fact sheet for Patients: BuildHer.es    METHODOLOGY: Isothermal Nucleic Acid Amplification      Ventricular Rate 06/19/2020 72  BPM Final    Atrial Rate 06/19/2020 72  BPM Final    P-R Interval 06/19/2020 152  ms Final    QRS Duration 06/19/2020 92  ms Final    Q-T Interval 06/19/2020 390  ms Final    QTc Calculation (Bazett) 06/19/2020 427  ms Final    P Axis 06/19/2020 46  degrees Final    R Axis 06/19/2020 16  degrees Final    T Axis 06/19/2020 15  degrees Final    Diagnosis 06/19/2020 Normal sinus rhythmNonspecific ST abnormalityAbnormal ECGWhen compared with ECG of 18-JUN-2019 18:21,Nonspecific T wave abnormality now evident in Inferior leadsNonspecific T wave abnormality now evident in Anterolateral leadsConfirmed by PATI Pham MD ([id number]) on 6/19/2020 5:37:28 PM   Final    Cholesterol, Total 06/20/2020 184  0 - 199 mg/dL Final    Triglycerides 06/20/2020 64  0 - 150 mg/dL Final    HDL 06/20/2020 41  40 - 60 mg/dL Final    LDL Calculated 06/20/2020 130* <100 mg/dL Final    VLDL Cholesterol Calculated 06/20/2020 13  Not Established mg/dL Final            Medications  Current Facility-Administered Medications: acetaminophen (TYLENOL) tablet 650 mg, 650 mg, Oral, Q4H PRN  LORazepam (ATIVAN) tablet 0.5 mg, 0.5 mg, Oral, Q6H PRN **OR** LORazepam (ATIVAN) injection 1 mg, 1 mg, Intramuscular, Q6H PRN  haloperidol lactate (HALDOL) injection 2 mg, 2 mg, Intramuscular, Q6H PRN **OR** haloperidol (HALDOL) tablet 2 mg, 2 mg, Oral, Q6H PRN  traZODone (DESYREL) tablet 25 mg, 25 mg, Oral, Nightly PRN  benztropine mesylate (COGENTIN) injection 1 mg, 1 mg, Intramuscular, BID PRN  magnesium hydroxide (MILK OF MAGNESIA) 400 MG/5ML suspension 30 mL, 30 mL, Oral, Daily PRN  aluminum & magnesium hydroxide-simethicone (MAALOX) 200-200-20 MG/5ML suspension 30 mL, 30 mL, Oral, Q6H PRN  traMADol (ULTRAM) tablet 50 mg, 50 mg, Oral, Q6H PRN  apixaban (ELIQUIS) tablet 5 mg, 5 mg, Oral, BID  finasteride (PROSCAR) tablet 5 mg, 5 mg, Oral, Daily  levETIRAcetam (KEPPRA) tablet 500 mg, 500 mg, Oral, BID  gabapentin (NEURONTIN) capsule 100 mg, 100 mg, Oral, TID    ASSESSMENT AND PLAN    Will cont meds as prescribed    1. Patient s symptoms   show no change  2. Probable discharge is per primary team  3. Discharge planning is incomplete  4 Suicidal ideation is unchanged  5 total time 40 minutes I spent 35 minutes face to face and more than 50 % was spent coordinating care and assessing sx's

## 2020-06-21 NOTE — PLAN OF CARE
Problem: Altered Mood, Deterioration in Function:  Goal: Able to verbalize reality based thinking  Description: Able to verbalize reality based thinking  Outcome: Ongoing   Ora [place] continues to demonstrate orientation to himself, but behaviors indicate he may be further oriented but does not answer questions. Pt preoccupied with land contracts, sharing more detailed information with writer that indicates which property he is willing to sell and those he is not, the vehicles he owns and who owes him money and how much. More interactive with writer this shift than previous day.

## 2020-06-21 NOTE — BH NOTE
Pt is A+O to self, he is mostly non-verbal, and will not answer assessment questions. He is irritable, suspicious and paranoid. He inspected his medications and then refused them. He guards some paperwork about his land ownership and keeps them in bed with him under the covers. He was cooperative with incontinence care. He is currently sleeping in his room and has no other needs at this time.

## 2020-06-22 PROCEDURE — 99232 SBSQ HOSP IP/OBS MODERATE 35: CPT | Performed by: PSYCHIATRY & NEUROLOGY

## 2020-06-22 PROCEDURE — 1240000000 HC EMOTIONAL WELLNESS R&B

## 2020-06-22 PROCEDURE — 99231 SBSQ HOSP IP/OBS SF/LOW 25: CPT | Performed by: PHYSICIAN ASSISTANT

## 2020-06-22 ASSESSMENT — PAIN SCALES - GENERAL
PAINLEVEL_OUTOF10: 0

## 2020-06-22 NOTE — PROGRESS NOTES
Department of Psychiatry  Attending Progress Note  Chief Complaint: follow-up psychosis    Patient's chart was reviewed and collaborated with  about the treatment plan. SUBJECTIVE:   Reviewed documentation since admission. Patient has been uncooperative with care. He will eat his meals, but refuses all his medications after inspecting them. Lying in bed with a folder of his financial documents with him, and perseverates in trying to show these to staff with each interaction. Is unwilling or unable to discuss anything else. During my interaction, pt only was able or willing to utter what sounded like \"Aie\" but was, from context not \"I\". He would not respond to any questions about his medical care, and simply kept pointing to different areas on the pages about his properties. He became visibly irritable with me when I discussed with him the fact that he was in a psychiatric hospital and why, bugging out his eyes and making a \"grr\" sound. It is quite clear he lacks capacity at this point in time. Unclear why this has not bee more obvious to previous facilities    ROS: Patient has new complaints: no  Sleeping adequately:  Yes   Appetite adequate: Yes  Attending groups: No: in room  Visitors:No    OBJECTIVE    Physical  VITALS:  /81   Pulse 64   Temp 97.8 °F (36.6 °C) (Oral)   Resp 16   Ht 5' 7\" (1.702 m)   Wt 130 lb (59 kg)   SpO2 98%   BMI 20.36 kg/m²      Mental Status Examination:    Appearance: WM, appears stated age, wearing hospital attire, Poor grooming and hygiene  Behavior/Attitude Toward Examiner: Uncooperative  Speech: Minimal, expressive aphasia s/p stroke 2017.   Mood: did not voice mood state  Affect: Irritable  Thought Processes: Difficult to full assess due to expressive aphasia,   Thought Content: Difficult to full assess due to expressive aphasia, did not appear to be paranoid  Perceptions: Difficult to full assess due to expressive aphasia, did not appear to be

## 2020-06-22 NOTE — PROGRESS NOTES
encourage patient to resume this medication      BPH  - Continue Pedro Valdes PA-C 3:01 PM 6/22/2020

## 2020-06-23 PROCEDURE — 99231 SBSQ HOSP IP/OBS SF/LOW 25: CPT | Performed by: PSYCHIATRY & NEUROLOGY

## 2020-06-23 PROCEDURE — 6370000000 HC RX 637 (ALT 250 FOR IP): Performed by: PSYCHIATRY & NEUROLOGY

## 2020-06-23 PROCEDURE — 1240000000 HC EMOTIONAL WELLNESS R&B

## 2020-06-23 RX ADMIN — ACETAMINOPHEN 650 MG: 325 TABLET ORAL at 14:11

## 2020-06-23 RX ADMIN — TRAMADOL HYDROCHLORIDE 50 MG: 50 TABLET, FILM COATED ORAL at 15:44

## 2020-06-23 ASSESSMENT — PAIN SCALES - GENERAL
PAINLEVEL_OUTOF10: 3
PAINLEVEL_OUTOF10: 3
PAINLEVEL_OUTOF10: 0
PAINLEVEL_OUTOF10: 8

## 2020-06-23 NOTE — BH NOTE
Primitivo Kuo was invited and encouraged to attend morning therapy groups, individual sessions, and offered individual resources. Primitivo Kuo declined all therapy interventions, pointing at \"no\" multiple times on his communication board.     Gabriella Sheehan

## 2020-06-23 NOTE — PROGRESS NOTES
Facility-Administered Medications: acetaminophen (TYLENOL) tablet 650 mg, 650 mg, Oral, Q4H PRN  LORazepam (ATIVAN) tablet 0.5 mg, 0.5 mg, Oral, Q6H PRN **OR** LORazepam (ATIVAN) injection 1 mg, 1 mg, Intramuscular, Q6H PRN  haloperidol lactate (HALDOL) injection 2 mg, 2 mg, Intramuscular, Q6H PRN **OR** haloperidol (HALDOL) tablet 2 mg, 2 mg, Oral, Q6H PRN  traZODone (DESYREL) tablet 25 mg, 25 mg, Oral, Nightly PRN  benztropine mesylate (COGENTIN) injection 1 mg, 1 mg, Intramuscular, BID PRN  magnesium hydroxide (MILK OF MAGNESIA) 400 MG/5ML suspension 30 mL, 30 mL, Oral, Daily PRN  aluminum & magnesium hydroxide-simethicone (MAALOX) 200-200-20 MG/5ML suspension 30 mL, 30 mL, Oral, Q6H PRN  traMADol (ULTRAM) tablet 50 mg, 50 mg, Oral, Q6H PRN  apixaban (ELIQUIS) tablet 5 mg, 5 mg, Oral, BID  finasteride (PROSCAR) tablet 5 mg, 5 mg, Oral, Daily  levETIRAcetam (KEPPRA) tablet 500 mg, 500 mg, Oral, BID  gabapentin (NEURONTIN) capsule 100 mg, 100 mg, Oral, TID    ASSESSMENT AND PLAN    Will cont meds as prescribed, though pt currently refusing. Will reach out to son to discuss longitudinal planning. Seems apparent patient may need guardianship beyond current POA. Moreover, will need to defer disposition decisions to patient's son. 1.Patient s symptoms   show no change  2. Probable discharge is per primary team  3. Discharge planning is incomplete  4 Suicidal ideation is unchanged  5 total time 20 minutes I spent 35 minutes face to face and more than 50 % was spent coordinating care and assessing sx's

## 2020-06-24 VITALS
WEIGHT: 130 LBS | OXYGEN SATURATION: 98 % | BODY MASS INDEX: 20.4 KG/M2 | RESPIRATION RATE: 16 BRPM | SYSTOLIC BLOOD PRESSURE: 113 MMHG | TEMPERATURE: 97.2 F | HEART RATE: 66 BPM | HEIGHT: 67 IN | DIASTOLIC BLOOD PRESSURE: 77 MMHG

## 2020-06-24 PROCEDURE — 5130000000 HC BRIDGE APPOINTMENT

## 2020-06-24 PROCEDURE — 99239 HOSP IP/OBS DSCHRG MGMT >30: CPT | Performed by: PSYCHIATRY & NEUROLOGY

## 2020-06-24 NOTE — BH NOTE
Amy Moreno was excused from [facility] due to completing ADLs at time of invite.     Jim Mckeon, MT-BC

## 2020-06-24 NOTE — PROGRESS NOTES
Progress Note    Admit Date:  6/18/2020    Subjective:  Mr. Zee Buitrago w/ expressive aphasia, able to answer yes and no questions, denies concerns. No concerns per RN staff. Objective:   /77   Pulse 66   Temp 97.2 °F (36.2 °C) (Oral)   Resp 16   Ht 5' 7\" (1.702 m)   Wt 130 lb (59 kg)   SpO2 98%   BMI 20.36 kg/m²          Intake/Output Summary (Last 24 hours) at 6/24/2020 1352  Last data filed at 6/24/2020 0830  Gross per 24 hour   Intake 360 ml   Output --   Net 360 ml       Physical Exam:  Gen: Elderly male, appears older than stated age, No distress. Alert. Resting in bed  Eyes:  No conjunctival injection. Neck: Trachea midline. Resp: No accessory muscle use. No crackles. No wheezes. No rhonchi. CV: Regular rate. Regular rhythm. No murmur. No rub. No edema. GI: Non-tender. Non-distended. Normal bowel sounds. Skin: Warm and dry. M/S: contracture of RUE  Neuro: Awake.  RUE and RLE weakness 2/2 to prior L MCA stroke, expressive aphasia, can answer yes or no  Psych: Per psychiatry team evaluation     Scheduled Meds:   apixaban  5 mg Oral BID    finasteride  5 mg Oral Daily    levETIRAcetam  500 mg Oral BID    gabapentin  100 mg Oral TID     PRN Meds:  acetaminophen, LORazepam **OR** LORazepam, haloperidol lactate **OR** haloperidol, traZODone, benztropine mesylate, magnesium hydroxide, aluminum & magnesium hydroxide-simethicone, traMADol    Assessment/Plan:    Psychosis   - per psychiatry team     Hx of PE/DVT  - s/p IVC filter placement but had recurrent clots   - on Eliquis but reportedly doesn't like to take this     Hx of prior L MCA stroke  Expressive Aphasia  - With residual right sided weakness and expressive aphasia at baseline   - Not on a statin?  - Will get a communication card from OT to help better understand patients needs     Seizure DO   - Has been on Keppra in the past but reports from SNF that patient has been refusing medication so they discontinued   - Will re-order and

## 2020-06-24 NOTE — PLAN OF CARE
[facility]  Discharge Note    Pt discharged with followings belongings:   Dentures: None  Vision - Corrective Lenses: None  Hearing Aid: None  Jewelry: None  Body Piercings Removed: N/A  Clothing: Other (Comment)(please see inventory sheet for details)  Were All Patient Medications Collected?: Not Applicable  Other Valuables: None(please see inventory sheet for details)   Valuables sent home with patient. Valuables retrieved from safe and returned to patient. Patient education on aftercare instructions: yes  Information faxed to HCP by RN Patient verbalize understanding of AVS:  yes. Status EXAM upon discharge:  Status and Exam  Normal: No  Facial Expression: Worried, Sad  Affect: Congruent  Level of Consciousness: Alert  Mood:Normal: No  Mood: Depressed, Suspicious  Motor Activity:Normal: No  Motor Activity: Decreased  Interview Behavior: Cooperative  Preception: [place] to Person  Attention:Normal: No  Attention: Distractible  Thought Processes: (ANTONIETTA)  Thought Content:Normal: No  Thought Content: Preoccupations  Hallucinations: None(no RTIS observed)  Delusions: No  Memory:Normal: (ANTONIETTA)  Memory: (ANTONIETTA)  Insight and Judgment: No  Insight and Judgment: Poor Judgment, Poor Insight  Present Suicidal Ideation: No(denies, no s/s observed)  Present Homicidal Ideation: No(denies, no s/s observed)      Metabolic Screening:    Lab Results   Component Value Date    LABA1C 5.8 06/20/2020       Lab Results   Component Value Date    CHOL 184 06/20/2020     Lab Results   Component Value Date    TRIG 64 06/20/2020     Lab Results   Component Value Date    HDL 41 06/20/2020     No components found for: [facility] EVALUATION AND TREATMENT [place]  Lab Results   Component Value Date    LABVLDL 13 06/20/2020       Orville Perry RN   Bridge Appointment completed: Reviewed Discharge Instructions with patient. Patient verbalizes understanding and agreement with the discharge plan using the teachback method.      Referral for Outpatient Tobacco

## 2020-06-24 NOTE — BH NOTE
Patient was provided with a mask to utilize on unit. Unfortunately, due to severe cognitive impairment, patient has demonstrated an inability to comply with mask use secondary to an inability to comprehend and/or consistently recall the need for consistent use. Multiple efforts to teach patient about necessity have proven ineffective, again due to severe underlying cognitive impairment. At this point in time it has become clinically apparent that efforts to force patient to utilize mask contribute only to increased behavioral disturbance. All staff will continue to utilize masks when interacting with patient. stated

## 2020-06-24 NOTE — BH NOTE
Jaden Somers was invited and encouraged to attend [facility].  Sabrinamarquiseus Somers declined invite, individual therapy sessions, and resources.      Alana Ramos

## 2022-01-28 LAB — SARS-COV-2: NORMAL
